# Patient Record
Sex: FEMALE | Race: WHITE | Employment: UNEMPLOYED | URBAN - METROPOLITAN AREA
[De-identification: names, ages, dates, MRNs, and addresses within clinical notes are randomized per-mention and may not be internally consistent; named-entity substitution may affect disease eponyms.]

---

## 2018-03-21 PROBLEM — D36.9 ADENOMATOUS POLYPS: Status: ACTIVE | Noted: 2018-03-21

## 2018-03-21 PROBLEM — Z79.01 CURRENT USE OF LONG TERM ANTICOAGULATION: Status: ACTIVE | Noted: 2018-03-21

## 2018-03-22 ENCOUNTER — APPOINTMENT (OUTPATIENT)
Dept: LAB | Age: 63
End: 2018-03-22
Attending: INTERNAL MEDICINE
Payer: COMMERCIAL

## 2018-03-22 DIAGNOSIS — I48.0 PAROXYSMAL ATRIAL FIBRILLATION (HCC): ICD-10-CM

## 2018-03-22 LAB
ALBUMIN SERPL-MCNC: 4 G/DL (ref 3.5–4.8)
ALP LIVER SERPL-CCNC: 59 U/L (ref 50–130)
ALT SERPL-CCNC: 30 U/L (ref 14–54)
AST SERPL-CCNC: 22 U/L (ref 15–41)
BILIRUB SERPL-MCNC: 0.5 MG/DL (ref 0.1–2)
BUN BLD-MCNC: 14 MG/DL (ref 8–20)
CALCIUM BLD-MCNC: 8.7 MG/DL (ref 8.3–10.3)
CHLORIDE: 106 MMOL/L (ref 101–111)
CO2: 27 MMOL/L (ref 22–32)
CREAT BLD-MCNC: 1.01 MG/DL (ref 0.55–1.02)
GLUCOSE BLD-MCNC: 72 MG/DL (ref 70–99)
M PROTEIN MFR SERPL ELPH: 7.3 G/DL (ref 6.1–8.3)
POTASSIUM SERPL-SCNC: 4.1 MMOL/L (ref 3.6–5.1)
SODIUM SERPL-SCNC: 139 MMOL/L (ref 136–144)

## 2018-03-22 PROCEDURE — 36415 COLL VENOUS BLD VENIPUNCTURE: CPT

## 2018-03-22 PROCEDURE — 80053 COMPREHEN METABOLIC PANEL: CPT

## 2018-12-11 PROBLEM — Z86.0100 PERSONAL HISTORY OF COLONIC POLYPS: Status: ACTIVE | Noted: 2018-12-11

## 2018-12-11 PROBLEM — Z86.010 PERSONAL HISTORY OF COLONIC POLYPS: Status: ACTIVE | Noted: 2018-12-11

## 2019-12-03 ENCOUNTER — APPOINTMENT (OUTPATIENT)
Dept: LAB | Age: 64
End: 2019-12-03
Attending: INTERNAL MEDICINE
Payer: MEDICARE

## 2019-12-03 DIAGNOSIS — I48.0 PAROXYSMAL ATRIAL FIBRILLATION (HCC): ICD-10-CM

## 2019-12-03 PROCEDURE — 36415 COLL VENOUS BLD VENIPUNCTURE: CPT

## 2019-12-03 PROCEDURE — 80053 COMPREHEN METABOLIC PANEL: CPT

## 2020-11-16 ENCOUNTER — APPOINTMENT (OUTPATIENT)
Dept: GENERAL RADIOLOGY | Facility: HOSPITAL | Age: 65
End: 2020-11-16
Attending: EMERGENCY MEDICINE
Payer: MEDICARE

## 2020-11-16 ENCOUNTER — HOSPITAL ENCOUNTER (EMERGENCY)
Facility: HOSPITAL | Age: 65
Discharge: HOME | End: 2020-11-16
Attending: EMERGENCY MEDICINE
Payer: MEDICARE

## 2020-11-16 VITALS
WEIGHT: 133 LBS | RESPIRATION RATE: 19 BRPM | HEIGHT: 63 IN | HEART RATE: 71 BPM | BODY MASS INDEX: 23.57 KG/M2 | DIASTOLIC BLOOD PRESSURE: 97 MMHG | OXYGEN SATURATION: 100 % | TEMPERATURE: 98 F | SYSTOLIC BLOOD PRESSURE: 177 MMHG

## 2020-11-16 DIAGNOSIS — U07.1 COVID-19 VIRUS INFECTION: Primary | ICD-10-CM

## 2020-11-16 DIAGNOSIS — E86.0 DEHYDRATION: ICD-10-CM

## 2020-11-16 PROCEDURE — 99453 REM MNTR PHYSIOL PARAM SETUP: CPT

## 2020-11-16 PROCEDURE — 36415 COLL VENOUS BLD VENIPUNCTURE: CPT

## 2020-11-16 PROCEDURE — 84145 PROCALCITONIN (PCT): CPT | Performed by: EMERGENCY MEDICINE

## 2020-11-16 PROCEDURE — 82728 ASSAY OF FERRITIN: CPT | Performed by: EMERGENCY MEDICINE

## 2020-11-16 PROCEDURE — 86140 C-REACTIVE PROTEIN: CPT | Performed by: EMERGENCY MEDICINE

## 2020-11-16 PROCEDURE — 96360 HYDRATION IV INFUSION INIT: CPT

## 2020-11-16 PROCEDURE — 82550 ASSAY OF CK (CPK): CPT | Performed by: EMERGENCY MEDICINE

## 2020-11-16 PROCEDURE — 71045 X-RAY EXAM CHEST 1 VIEW: CPT | Performed by: EMERGENCY MEDICINE

## 2020-11-16 PROCEDURE — 85025 COMPLETE CBC W/AUTO DIFF WBC: CPT | Performed by: EMERGENCY MEDICINE

## 2020-11-16 PROCEDURE — 85379 FIBRIN DEGRADATION QUANT: CPT | Performed by: EMERGENCY MEDICINE

## 2020-11-16 PROCEDURE — 80053 COMPREHEN METABOLIC PANEL: CPT | Performed by: EMERGENCY MEDICINE

## 2020-11-16 PROCEDURE — 93005 ELECTROCARDIOGRAM TRACING: CPT

## 2020-11-16 PROCEDURE — 99285 EMERGENCY DEPT VISIT HI MDM: CPT

## 2020-11-16 PROCEDURE — 99284 EMERGENCY DEPT VISIT MOD MDM: CPT

## 2020-11-16 PROCEDURE — 84484 ASSAY OF TROPONIN QUANT: CPT | Performed by: EMERGENCY MEDICINE

## 2020-11-16 PROCEDURE — 93010 ELECTROCARDIOGRAM REPORT: CPT

## 2020-11-16 PROCEDURE — 83615 LACTATE (LD) (LDH) ENZYME: CPT | Performed by: EMERGENCY MEDICINE

## 2020-11-16 PROCEDURE — 83880 ASSAY OF NATRIURETIC PEPTIDE: CPT | Performed by: EMERGENCY MEDICINE

## 2020-11-16 RX ORDER — SODIUM CHLORIDE 9 MG/ML
INJECTION, SOLUTION INTRAVENOUS CONTINUOUS
Status: DISCONTINUED | OUTPATIENT
Start: 2020-11-16 | End: 2020-11-16

## 2020-11-16 NOTE — ED INITIAL ASSESSMENT (HPI)
Pt reports generalized fatigue, dizziness today, and poor appetite. Returned from [place] on Wednesday. Possible covid contact prior to coming home.

## 2020-11-16 NOTE — ED NOTES
Ambulated pt with pulse ox. on left index finger. Ranged from 99%-100%, respirations went from 19 to 23 breaths/min, HR increased from 80 to 85.  Pt states she feels tired and \"a little short of breath\" but she feels like that every time she goes for a wa

## 2020-11-16 NOTE — ED PROVIDER NOTES
Patient Seen in: [facility] Emergency Department      History   Patient presents with:  Headache  Testing    Stated Complaint[place] [place] [facility]   neighbors in [place] are sick    HPI    78-year-old female with history HPI.  Constitutional and vital signs reviewed. All other systems reviewed and negative except as noted above.     Physical Exam     ED Triage Vitals [11/16/20 1430]   BP (!) 160/92   Pulse 62   Resp 20   Temp 97.6 °F (36.4 °C)   Temp src Temporal   SpO Please view results for these tests on the individual orders.    D-DIMER   PROCALCITONIN   RAINBOW DRAW BLUE   RAINBOW DRAW LAVENDER   RAINBOW DRAW LIGHT GREEN   RAINBOW DRAW GOLD   BLOOD CULTURE   BLOOD CULTURE   CBC W/ DIFFERENTIAL                  MD

## 2021-02-07 ENCOUNTER — APPOINTMENT (OUTPATIENT)
Dept: GENERAL RADIOLOGY | Age: 66
End: 2021-02-07
Attending: EMERGENCY MEDICINE
Payer: MEDICARE

## 2021-02-07 ENCOUNTER — HOSPITAL ENCOUNTER (EMERGENCY)
Age: 66
Discharge: HOME | End: 2021-02-07
Attending: EMERGENCY MEDICINE
Payer: MEDICARE

## 2021-02-07 VITALS
WEIGHT: 130 LBS | HEIGHT: 63 IN | DIASTOLIC BLOOD PRESSURE: 65 MMHG | TEMPERATURE: 98 F | SYSTOLIC BLOOD PRESSURE: 106 MMHG | HEART RATE: 74 BPM | OXYGEN SATURATION: 96 % | BODY MASS INDEX: 23.04 KG/M2 | RESPIRATION RATE: 20 BRPM

## 2021-02-07 DIAGNOSIS — R00.2 PALPITATIONS: Primary | ICD-10-CM

## 2021-02-07 LAB
ALBUMIN SERPL-MCNC: 3.8 G/DL (ref 3.4–5)
ALBUMIN/GLOB SERPL: 1.1 {RATIO} (ref 1–2)
ALP LIVER SERPL-CCNC: 57 U/L
ALT SERPL-CCNC: 24 U/L
ANION GAP SERPL CALC-SCNC: 6 MMOL/L (ref 0–18)
AST SERPL-CCNC: 30 U/L (ref 15–37)
BASOPHILS # BLD AUTO: 0.04 X10(3) UL (ref 0–0.2)
BASOPHILS NFR BLD AUTO: 0.5 %
BILIRUB SERPL-MCNC: 0.6 MG/DL (ref 0.1–2)
BUN BLD-MCNC: 12 MG/DL (ref 7–18)
BUN/CREAT SERPL: 12.5 (ref 10–20)
CALCIUM BLD-MCNC: 8.7 MG/DL (ref 8.5–10.1)
CHLORIDE SERPL-SCNC: 109 MMOL/L (ref 98–112)
CO2 SERPL-SCNC: 23 MMOL/L (ref 21–32)
CREAT BLD-MCNC: 0.96 MG/DL
D-DIMER: <0.27 UG/ML FEU (ref ?–0.67)
DEPRECATED RDW RBC AUTO: 44.6 FL (ref 35.1–46.3)
EOSINOPHIL # BLD AUTO: 0.06 X10(3) UL (ref 0–0.7)
EOSINOPHIL NFR BLD AUTO: 0.8 %
ERYTHROCYTE [DISTWIDTH] IN BLOOD BY AUTOMATED COUNT: 13.5 % (ref 11–15)
GLOBULIN PLAS-MCNC: 3.6 G/DL (ref 2.8–4.4)
GLUCOSE BLD-MCNC: 154 MG/DL (ref 70–99)
HAV IGM SER QL: 1.9 MG/DL (ref 1.6–2.6)
HCT VFR BLD AUTO: 42.6 %
HGB BLD-MCNC: 14.4 G/DL
IMM GRANULOCYTES # BLD AUTO: 0.02 X10(3) UL (ref 0–1)
IMM GRANULOCYTES NFR BLD: 0.3 %
LYMPHOCYTES # BLD AUTO: 1.16 X10(3) UL (ref 1–4)
LYMPHOCYTES NFR BLD AUTO: 14.5 %
M PROTEIN MFR SERPL ELPH: 7.4 G/DL (ref 6.4–8.2)
MCH RBC QN AUTO: 30.4 PG (ref 26–34)
MCHC RBC AUTO-ENTMCNC: 33.8 G/DL (ref 31–37)
MCV RBC AUTO: 89.9 FL
MONOCYTES # BLD AUTO: 0.55 X10(3) UL (ref 0.1–1)
MONOCYTES NFR BLD AUTO: 6.9 %
NEUTROPHILS # BLD AUTO: 6.17 X10 (3) UL (ref 1.5–7.7)
NEUTROPHILS # BLD AUTO: 6.17 X10(3) UL (ref 1.5–7.7)
NEUTROPHILS NFR BLD AUTO: 77 %
OSMOLALITY SERPL CALC.SUM OF ELEC: 289 MOSM/KG (ref 275–295)
PLATELET # BLD AUTO: 321 10(3)UL (ref 150–450)
POTASSIUM SERPL-SCNC: 3.8 MMOL/L (ref 3.5–5.1)
RBC # BLD AUTO: 4.74 X10(6)UL
SODIUM SERPL-SCNC: 138 MMOL/L (ref 136–145)
TROPONIN I SERPL-MCNC: <0.045 NG/ML (ref ?–0.04)
TROPONIN I SERPL-MCNC: <0.045 NG/ML (ref ?–0.04)
WBC # BLD AUTO: 8 X10(3) UL (ref 4–11)

## 2021-02-07 PROCEDURE — 85025 COMPLETE CBC W/AUTO DIFF WBC: CPT | Performed by: EMERGENCY MEDICINE

## 2021-02-07 PROCEDURE — 93010 ELECTROCARDIOGRAM REPORT: CPT

## 2021-02-07 PROCEDURE — 71045 X-RAY EXAM CHEST 1 VIEW: CPT | Performed by: EMERGENCY MEDICINE

## 2021-02-07 PROCEDURE — 99284 EMERGENCY DEPT VISIT MOD MDM: CPT

## 2021-02-07 PROCEDURE — 85379 FIBRIN DEGRADATION QUANT: CPT | Performed by: EMERGENCY MEDICINE

## 2021-02-07 PROCEDURE — 83735 ASSAY OF MAGNESIUM: CPT | Performed by: EMERGENCY MEDICINE

## 2021-02-07 PROCEDURE — 84484 ASSAY OF TROPONIN QUANT: CPT | Performed by: EMERGENCY MEDICINE

## 2021-02-07 PROCEDURE — 93005 ELECTROCARDIOGRAM TRACING: CPT

## 2021-02-07 PROCEDURE — 36415 COLL VENOUS BLD VENIPUNCTURE: CPT

## 2021-02-07 PROCEDURE — 80053 COMPREHEN METABOLIC PANEL: CPT | Performed by: EMERGENCY MEDICINE

## 2021-02-07 PROCEDURE — 99285 EMERGENCY DEPT VISIT HI MDM: CPT

## 2021-02-07 NOTE — ED INITIAL ASSESSMENT (HPI)
Patient reports having 1st covid vaccine yesterday - c/o aches, weakness since this am - patient also reports rapid heart rate in 140s per pulse ox and bp machine at home

## 2021-02-07 NOTE — ED PROVIDER NOTES
Patient Seen in: THE [facility] Emergency Department In [place]      History   Patient presents with:  Medication Reaction    Stated Complaint: Covid vaccine yesterday- not feeling well- rapid heart rate and weakness    HPI/Subjective:   HPI    Patient is a 6 vital signs reviewed. All other systems reviewed and negative except as noted above.     Physical Exam     ED Triage Vitals [02/07/21 1551]   /80   Pulse 96   Resp 16   Temp 98 °F (36.7 °C)   Temp src Temporal   SpO2 96 %   O2 Device None (Room a ---------                               -----------         ------                     CBC W/ DIFFERENTIAL[[id number]]                              Final result                 Please view results for these tests on the individual orders.    RAINBOW DRAW B Normal   TROPONIN I - Normal   CBC WITH DIFFERENTIAL WITH PLATELET    Narrative: The following orders were created for panel order CBC WITH DIFFERENTIAL WITH PLATELET.   Procedure                               Abnormality         Status for discharge                     Disposition and Plan     Clinical Impression:  Palpitations  (primary encounter diagnosis)    Disposition:  Discharge  2/7/2021  6:39 pm    Follow-up:  Raimundo Jack MD  [phone] [facility] [phone]

## 2021-02-08 LAB
ATRIAL RATE: 92 BPM
P AXIS: 69 DEGREES
P-R INTERVAL: 166 MS
Q-T INTERVAL: 410 MS
QRS DURATION: 76 MS
QTC CALCULATION (BEZET): 507 MS
R AXIS: 56 DEGREES
T AXIS: 86 DEGREES
VENTRICULAR RATE: 92 BPM

## 2023-11-28 ENCOUNTER — OFFICE VISIT (OUTPATIENT)
Dept: HEMATOLOGY/ONCOLOGY | Facility: HOSPITAL | Age: 68
End: 2023-11-28
Attending: SURGERY
Payer: MEDICARE

## 2023-11-28 ENCOUNTER — GENETICS ENCOUNTER (OUTPATIENT)
Dept: GENETICS | Facility: HOSPITAL | Age: 68
End: 2023-11-28
Attending: SURGERY
Payer: MEDICARE

## 2023-11-28 ENCOUNTER — OFFICE VISIT (OUTPATIENT)
Facility: LOCATION | Age: 68
End: 2023-11-28
Payer: MEDICARE

## 2023-11-28 ENCOUNTER — NURSE NAVIGATOR ENCOUNTER (OUTPATIENT)
Dept: HEMATOLOGY/ONCOLOGY | Facility: HOSPITAL | Age: 68
End: 2023-11-28

## 2023-11-28 VITALS
HEART RATE: 78 BPM | RESPIRATION RATE: 16 BRPM | OXYGEN SATURATION: 98 % | WEIGHT: 141.38 LBS | TEMPERATURE: 97 F | BODY MASS INDEX: 25 KG/M2 | SYSTOLIC BLOOD PRESSURE: 175 MMHG | DIASTOLIC BLOOD PRESSURE: 91 MMHG

## 2023-11-28 DIAGNOSIS — C50.919 MALIGNANT NEOPLASM OF BREAST (FEMALE) (HCC): Primary | ICD-10-CM

## 2023-11-28 DIAGNOSIS — Z17.0 MALIGNANT NEOPLASM OF RIGHT BREAST IN FEMALE, ESTROGEN RECEPTOR POSITIVE, UNSPECIFIED SITE OF BREAST: ICD-10-CM

## 2023-11-28 DIAGNOSIS — I48.0 PAROXYSMAL ATRIAL FIBRILLATION (HCC): ICD-10-CM

## 2023-11-28 DIAGNOSIS — C50.411 MALIGNANT NEOPLASM OF UPPER-OUTER QUADRANT OF RIGHT BREAST IN FEMALE, ESTROGEN RECEPTOR POSITIVE: Primary | ICD-10-CM

## 2023-11-28 DIAGNOSIS — Z85.3 HISTORY OF RIGHT BREAST CANCER: ICD-10-CM

## 2023-11-28 DIAGNOSIS — Z79.01 ANTICOAGULATED: ICD-10-CM

## 2023-11-28 DIAGNOSIS — C50.911 MALIGNANT NEOPLASM OF RIGHT BREAST IN FEMALE, ESTROGEN RECEPTOR POSITIVE, UNSPECIFIED SITE OF BREAST: ICD-10-CM

## 2023-11-28 DIAGNOSIS — Z17.0 MALIGNANT NEOPLASM OF UPPER-OUTER QUADRANT OF RIGHT BREAST IN FEMALE, ESTROGEN RECEPTOR POSITIVE: Primary | ICD-10-CM

## 2023-11-28 DIAGNOSIS — Z85.3 PERSONAL HISTORY OF BREAST CANCER: ICD-10-CM

## 2023-11-28 PROCEDURE — 99211 OFF/OP EST MAY X REQ PHY/QHP: CPT

## 2023-11-28 PROCEDURE — 36415 COLL VENOUS BLD VENIPUNCTURE: CPT

## 2023-11-28 PROCEDURE — 96040 HC GENETIC COUNSELING EA 30 MIN: CPT | Performed by: GENETIC COUNSELOR, MS

## 2023-11-28 PROCEDURE — 99205 OFFICE O/P NEW HI 60 MIN: CPT | Performed by: SURGERY

## 2023-11-28 NOTE — PROGRESS NOTES
Met with patient in clinic. Introduced myself as one the breast nurse navigators and explained the role of the breast nurse navigator and coordination of care. Explained the role of all of the physicians involved in her care including the surgeon, medical oncologist, radiation oncologist, and possibly plastic surgeon. Reviewed over the patients pathology report and discussed receptors including ER/NH/ and Her 2. Patient was given the breast cancer treatment handbook and reviewed over how to use this resource. Discussed the breast multidisciplinary conference and that her case was discussed. Patient was given the contact information for the social workers at the [facility] and resources for support as requested. Breast cancer journey map provided to patient and discussed possible Oncotype, if applicable, and other cancer treatments such as chemotherapy and radiation. Provided lumpectomy surgical sheet. Next step in care will be to have genetic testing done today and then will plan for unilateral mastectomy. Pt was provided with breast nurse navigator contact information and was encouraged to phone with any other questions or concerns.

## 2023-11-28 NOTE — H&P
New Patient Visit Note       Active Problems      1. Malignant neoplasm of upper-outer quadrant of right breast in female, estrogen receptor positive     2. History of right breast cancer    3. Paroxysmal atrial fibrillation (HCC)    4. Anticoagulated        Chief Complaint   Recurrent right breast cancer      History of Present Illness   The patient is a 66-year-old female seen at the request of her primary care physician regarding a new diagnosis of right breast cancer. The patient has a prior history of right-sided breast cancer, treated in November 2000 and [place]. At that time, she underwent lumpectomy with axillary lymph node dissection. She had 35 sessions of radiation therapy. She then took tamoxifen for 2 years and then was switched to another medication for 2-1/2 years. At the age of 48, since her cancer was driven by estrogen, she underwent a total hysterectomy with bilateral oophorectomy. The patient states she had significant hot flashes after starting tamoxifen that lasted 15 years. She has no family history of breast, ovarian, or prostate cancer. She has been pregnant once, at the age of 33. She has 1 daughter. She did not breast-feed her daughter. She went through menarche at the age of 11 and menopause after her hysterectomy at the age of 48. The patient does have a history of atrial fibrillation and is on Xarelto. Allergies  Nidia has No Known Allergies. Past Medical / Surgical / Social / Family History    The past medical and past surgical history have been reviewed by me today.     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    Anxiety     Arrhythmia     Arthritis     Atrial fibrillation (HCC)     Back pain     Blurred vision     Breast cancer (HCC)     Easy bruising     Fatigue     Feeling lonely     Heart palpitations     History of depression     History of rheumatic fever Hypercholesterolemia     Hypertension     Mild tricuspid regurgitation     Night sweats     Rheumatic heart disease     Skin blushing/flushing     Sleep disturbance     Stress     Syncope     Thyroid disease     TIA (transient ischemic attack)     Wears glasses      Past Surgical History:   Procedure Laterality Date    HYSTERECTOMY      OTHER SURGICAL HISTORY      Mastectomy    THYROIDECTOMY      Partial       The family history and social history have been reviewed by me today. Family History   Problem Relation Age of Onset    Heart Attack Father     Alcohol and Other Disorders Associated Father     Hypertension Father     Heart Attack Other      Social History     Socioeconomic History    Marital status:     Number of children: 1   Tobacco Use    Smoking status: Never    Smokeless tobacco: Never   Substance and Sexual Activity    Alcohol use: Yes     Alcohol/week: 0.0 standard drinks of alcohol     Comment: Rare glass of wine/occassional    Drug use: No   Other Topics Concern    Caffeine Concern Yes     Comment: 1-2 large cups daily        Current Outpatient Medications:     Rivaroxaban (XARELTO) 20 MG Oral Tab, TAKE ONE TABLET BY MOUTH DAILY WITH FOOD, Disp: 90 tablet, Rfl: 3    dronedarone HCl (MULTAQ) 400 MG Oral Tab, Take 1 tablet (400 mg total) by mouth 2 (two) times daily. , Disp: 180 tablet, Rfl: 3    ATORVASTATIN 10 MG Oral Tab, TAKE 1 TABLET BY MOUTH EVERY NIGHT AT BEDTIME, Disp: 90 tablet, Rfl: 3    METOPROLOL SUCCINATE ER 25 MG Oral Tablet 24 Hr, TAKE 1/2 TABLET(12.5 MG) BY MOUTH EVERY DAY (Patient not taking: Reported on 11/28/2023), Disp: 45 tablet, Rfl: 3    escitalopram 10 MG Oral Tab, TK ONE T PO ONCE A DAY (Patient not taking: Reported on 11/28/2023), Disp: , Rfl: 0    PEG 3350-KCl-NaBcb-NaCl-NaSulf (PEG 3350/ELECTROLYTES) 240 g Oral Recon Soln, Take as directed by physician.  (Patient not taking: Reported on 11/28/2023), Disp: 4000 mL, Rfl: 0    Coenzyme Q10 (CO Q-10) 100 MG Oral Cap, Take by mouth., Disp: , Rfl:     Levothyroxine Sodium (SYNTHROID) 50 MCG Oral Tab, Take 1 tablet (50 mcg total) by mouth before breakfast., Disp: , Rfl: 2    Ergocalciferol (VITAMIN D OR), Take 400 Units by mouth., Disp: , Rfl:     Review of Systems:    Allergic/Immuno:  Review of patient's allergies performed. Cardiovascular:  Negative for cool extremity. Positive for irregular heartbeat/palpitations  Constitutional:  Negative for decreased activity, fever, irritability and lethargy  Endocrine:  Negative for abnormal sleep patterns, increased activity, polydipsia and polyphagia  ENMT:  Negative for ear drainage, hearing loss and nasal drainage  Eyes:  Negative for eye discharge and vision loss  Gastrointestinal:  Negative for abdominal pain, constipation, decreased appetite, diarrhea and vomiting  Genitourinary:  Negative for dysuria and hematuria  Hema/Lymph:  Negative for easy bleeding and easy bruising  Integumentary:  Negative for pruritus and rash  Musculoskeletal:  Negative for bone/joint symptoms  Neurological:  Negative for gait disturbance  Psychiatric:  Negative for inappropriate interaction and psychiatric symptoms  Respiratory:  Negative for cough, dyspnea and wheezing       Physical Exam  Vitals and nursing note reviewed. Constitutional:       General: She is not in acute distress. Appearance: She is well-developed. She is not diaphoretic. HENT:      Head: Normocephalic and atraumatic. Eyes:      General: No scleral icterus. Conjunctiva/sclera: Conjunctivae normal.      Pupils: Pupils are equal, round, and reactive to light. Neck:      Vascular: No JVD. Trachea: Trachea normal.   Cardiovascular:      Rate and Rhythm: Normal rate. Rhythm irregularly irregular. Heart sounds: S1 normal and S2 normal. No murmur heard. Pulmonary:      Effort: No accessory muscle usage or respiratory distress. Breath sounds: No decreased breath sounds, wheezing, rhonchi or rales.    Chest: Chest wall: No mass. Breasts:     Breasts are symmetrical.      Right: No inverted nipple, mass, nipple discharge, skin change or tenderness. Left: No inverted nipple, mass, nipple discharge, skin change or tenderness. Comments: Well-healed right breast scar. Biopsy site with slight ecchymosis. There is significant size disparity between the postsurgical right breast and the left breast.  Musculoskeletal:      Cervical back: Full passive range of motion without pain and neck supple. Lymphadenopathy:      Head:      Right side of head: No submental, submandibular, preauricular, posterior auricular or occipital adenopathy. Left side of head: No submental, submandibular, preauricular, posterior auricular or occipital adenopathy. Cervical:      Right cervical: No superficial, deep or posterior cervical adenopathy. Left cervical: No superficial, deep or posterior cervical adenopathy. Upper Body:      Right upper body: No axillary or pectoral adenopathy. Left upper body: No axillary or pectoral adenopathy. Neurological:      Mental Status: She is alert and oriented to person, place, and time. Psychiatric:         Speech: Speech normal.         Behavior: Behavior normal.           PATHOLOGY   I reviewed the pathology report. FINAL PATHOLOGY DIAGNOSIS:        Right breast (10:00, 3 cm from nipple), ultrasound guided biopsy: Invasive    mammary carcinoma, micropapillary type. Histologic grade ([place] histologic grading): Grade 2 (score 7)    (Tubules: Score 3; Nuclear : Score 3; and Mitotic rate: Score 1)      Tumor size: At least 0.5 cm maximum dimension. Extent of tumor: The tumor comprises approximately 40% of the biopsy, and is    present in several submitted biopsy fragments. Additional Tumor Features:    Lymphatic/Vascular Invasion: Identified. Microcalcifications: Not identified.       Right breast tumor markers, block A2:    Estrogen receptor (ER): Positive (approximately 95%; staining intensity:    strong). Progesterone receptor (PgR): Positive (approximately 95%; staining    intensity: strong). Her-2: Negative (1+). Ki 67 (Mib-1): High proliferative index (approximately 25%). Assessment   1. Malignant neoplasm of upper-outer quadrant of right breast in female, estrogen receptor positive     2. History of right breast cancer    3. Paroxysmal atrial fibrillation (HCC)    4. Anticoagulated          Plan   I had a lengthy discussion with the patient regarding the diagnosis of breast cancer. We discussed the biology of breast cancer as well as the difference between in situ and invasive disease. We discussed the treatment options of breast cancer in regards to surgery, radiation, chemotherapy, and endocrine therapy. In regards to surgery, we discussed the options of lumpectomy and mastectomy and the difference between each option. The possibility of another surgery to achieve clear margins was also discussed. The risks of surgery were discussed including bleeding, infection, need for reoperation, and arm swelling. She expressed understanding. We discussed the role of radiation therapy and if she opts for lumpectomy, she will need radiation therapy and the length and course of radiation therapy will depend on the final pathology. We also discussed situations where radiation therapy is needed after mastectomy (large tumor size, positive lymph nodes and positive margins on a mastectomy). The final decision regarding raditation therapy would be made after surgery. In regards to chemotherapy, I will send her to medical oncology. Based on the final pathology report it will be determined if chemotherapy or endocrine therapy would provide any additional benefit. Since this is a recurrence in the same breast, she will need a mastectomy. We discussed the option of reconstruction, but she declined.   The patient does qualify for genetic testing. She will meet with our genetic counselor today. I did discuss that if she is gene positive, I would then recommend a bilateral mastectomy. She will need preoperative cardiac clearance and advice regarding management of her Xarelto prior to surgery. Her blood pressure was also found to be elevated at today's visit. I recommended she call her PCP. All of her questions were answered to her satisfaction. Preoperatively, the patient has stage I breast cancer.        Amina Dalton MD

## 2023-11-28 NOTE — PROGRESS NOTES
Patient presents to clinic for breast consultation for recent invasive mammary carcinoma diagnosis. Patient's mammogram and biopsy were performed at [facility]. Various discs and reports were brought in and given to the nurse navigator team.  Patient's blood pressure on arrival was 172/92. Patient denies swelling, redness, warmth, fever, bleeding/discharge, or pain. Dr. Maryanne Mohs informed. Medication, allergies, and history reviewed and updated. Post visit blood pressure readin/91. Per Dr. Maryanne Mohs, patient instructed to contact her PCP to discuss further.

## 2023-11-29 ENCOUNTER — NURSE NAVIGATOR ENCOUNTER (OUTPATIENT)
Dept: HEMATOLOGY/ONCOLOGY | Facility: HOSPITAL | Age: 68
End: 2023-11-29

## 2023-11-29 NOTE — PROGRESS NOTES
Referring Provider:  Mehnaz Luciano MD    Additional Provider(s):  Jammie Miller PA-C    Reason for Referral:  Aurea Cotton was referred for genetic counseling because of a new diagnosis of breast cancer. Ms. Elie Ribera is a 77year-old woman of [place] [facility] descent who was diagnosed with an ER/PA-positive, HER2-negative invasive mammary carcinoma of the right breast on 23. Her reported medical history is notable for a right-sided, hormone positive breast cancer at age 50 for which she had a right lumpectomy and radiation treatment. She took Tamoxifen, reportedly had a HOLLY/BSO at age 48, and then took a different hormonal suppression agent for several years after that. Ms. Mónica Brower 16 colonoscopy was negative for polyps; a repeat screening colonoscopy was recommended in 10 years. Social History:  Ms. Elie Ribera was seen today by herself. She lives in [facility]. Her goal is to complete breast surgery before [place] because her daughter and grandchildren are going to be visiting from [place]. Family History:   A three generation pedigree was obtained. Ms. Elie Ribera has a 40year-old daughter and two grandchildren. Ms. Elie Ribera had two brothers and no sisters. Both of Ms. Ricci's brothers  in their 62s from heart disease. Ms. Mónica Brower mother  at age 80; her only history of cancer was of non-melanoma skin cancer. Ms. Mónica Brower mother had two brothers and no sisters. One of Ms. Ricci's maternal cousins had colorectal cancer at an unspecified older age. Ms. Mónica Brower maternal grandparents  in their [de-identified] and did not have cancer. Ms. Mónica Brower father  at age 68 and did not have cancer. Ms. Mónica Brower father had four brothers and four sisters, none of whom had cancer. Ms. Mónica Brower paternal grandparents  in their [de-identified] and did not have cancer. Please see the pedigree for additional family history information. Counseling:    The following information was discussed with Ms. Kavin Boston. Genetics of Breast Cancer: In the [place], approximately 1 in 8 women will develop breast cancer. Although the majority of breast cancer cases are sporadic, approximately 5-10% of women with breast cancer have a hereditary cancer syndrome. Signs of a hereditary cancer syndrome include some rare cancers, common cancers occurring at unusually young ages, multiple primary cancers in the same individual, or the same type of cancer or related cancers (e.g., breast and ovarian, colorectal and endometrial) in three or more individuals in the same lineage. Mutations in the genes, BRCA1 and BRCA2, account for the majority of hereditary breast and ovarian cancer families. Mutations in genes other than BRCA1/2, many of which now have medical management recommendations (e.g., NELL, CHEK2, PALB2) are identified in 3-10% of individuals tested using a multigene panel. Risk Assessment:   Ms. Kavin Boston meets NCCN Guidelines testing criteria for high-penetrance breast cancer susceptibility genes. Risk assessment to estimate the chance of Ms. Kavin Boston harboring a BRCA1/2 pathogenic variant was done by using the Chicho II Model. Based on this model, Ms. Orion Ventura risk of carrying a BRCA1/2 pathogenic variant is estimated to be 8%. It is important to note that all prediction models have limitations and medical management should be based on clinical judgment, and personal and family history. In addition, there are no prediction models or testing criteria for moderate penetrance cancer predisposition genes such as NELL and CHEK2. I recommend that testing be performed as part of a multigene panel. Genetic Testing (Panel): The pros, cons, and limitations of genetic testing were discussed including the potential implications of test results on clinical management. If a pathogenic variant is not identified (negative result), it is still possible that Ms. Kavin Boston has a pathogenic variant in one of these genes that was not detected by the genetic test, or that the family is dealing with a hereditary cancer syndrome involving a different gene. It is also possible that Ms. Ricci's relatives have a pathogenic variant in one of these genes that Ms. Arlen Elena did not inherit. In this scenario, options for cancer screening/management should be determined according to personal and family histories and should be discussed with a physician. A variant of uncertain significance is a DNA change that may or may not alter the function of the gene; therefore, it is usually not possible to determine if the gene variant is responsible for an individual's increased cancer risk. If Ms. Arlen Elena is found to carry a pathogenic variant in a cancer predisposition gene, she is at significantly increased risk for various cancers. The magnitude of these risks, and the cancers for which she is at increased risk would depend on the gene involved. Medical recommendations for individuals with BRCA1/2 pathogenic variants were reviewed as an example. It was also explained that for some of the genes for which testing is available, the associated cancer risks have yet to be determined and medical management recommendations may not yet be available for individuals with pathogenic variants in these genes. If she were to test positive for a pathogenic variant, her children and siblings would each have a 50% chance of carrying the same variant. At-risk adults (>18) would have the option of pursuing targeted genetic testing to clarify their cancer risks. Genetic test results have implications for the entire biological family. Thus, it is recommended that she share her genetic test results with her biological family members so that they may have their risk assessed. Genetic Information Non-Discrimination Act:   The legal protections of the Genetic Information Nondiscrimination Act (JESSIE) for health insurance and employment were humphreyJessika ROMAN does not provide protection for life insurance, disability or long-term care insurance. Summary and Plan:  Ms. Karma Walter was referred for genetic counseling because of a new diagnosis of breast cancer. Her reported family history is not highly suspicious for a hereditary cancer syndrome; however, genetic testing on Ms. Karma Walter for BRCA1/2 pathogenic variants as part of a multigene panel is indicated based on her personal history of breast cancer. At the conclusion of the counseling session Ms. Karma Walter decided to proceed with genetic testing. Written consent was obtained. Blood and paperwork were sent to [facility] for their Breast Cancer STAT panel (NELL, BRCA1, BRCA2, CDH1, CHEK2, PALB2, PTEN, STK11, and TP53). I anticipate that Ms. Ricci's results will be available within 6-13 days and will call her with the results. Results will also be communicated to Dr. Adryan Valiente and Mr. Stefany Fountain. Approximately 40 minutes was spent in consultation with Ms. Karma Walter.

## 2023-11-30 ENCOUNTER — LAB ENCOUNTER (OUTPATIENT)
Dept: LAB | Facility: HOSPITAL | Age: 68
End: 2023-11-30
Attending: SURGERY
Payer: MEDICARE

## 2023-11-30 DIAGNOSIS — C50.919 INVASIVE CARCINOMA OF BREAST (HCC): Primary | ICD-10-CM

## 2023-11-30 PROCEDURE — 88321 CONSLTJ&REPRT SLD PREP ELSWR: CPT

## 2023-12-01 ENCOUNTER — TELEPHONE (OUTPATIENT)
Facility: LOCATION | Age: 68
End: 2023-12-01

## 2023-12-01 DIAGNOSIS — C50.911 MALIGNANT NEOPLASM OF RIGHT FEMALE BREAST, UNSPECIFIED ESTROGEN RECEPTOR STATUS, UNSPECIFIED SITE OF BREAST (HCC): Primary | ICD-10-CM

## 2023-12-04 ENCOUNTER — TELEPHONE (OUTPATIENT)
Facility: LOCATION | Age: 68
End: 2023-12-04

## 2023-12-04 NOTE — TELEPHONE ENCOUNTER
RN [facility] from [facility] called to see if the patient needed a pre-op authorization before the patient's surgery with .     RIGHT BREAST MASTECTOMY 1/8/24    Call back # [phone]

## 2023-12-05 ENCOUNTER — TELEPHONE (OUTPATIENT)
Dept: HEMATOLOGY/ONCOLOGY | Facility: HOSPITAL | Age: 68
End: 2023-12-05

## 2023-12-05 ENCOUNTER — GENETICS ENCOUNTER (OUTPATIENT)
Dept: HEMATOLOGY/ONCOLOGY | Facility: HOSPITAL | Age: 68
End: 2023-12-05

## 2023-12-05 NOTE — TELEPHONE ENCOUNTER
Patient called back in regards to the VM I left in regards to the pre op mastectomy class. Described the pre op mastectomy class and what it entails and scheduled her before her mastectomy surgery with Dr. Lisa Wilson on January 3, 2024 at the [facility]. She thanked me for the assistance. Pt was provided with the breast nurse navigators contact information and was encouraged to phone with any other questions or concerns.

## 2023-12-05 NOTE — PROGRESS NOTES
Referring Provider:                    Ashly Walker MD     Additional Provider(s):              Marla Miller PA-C    Reason for Referral:  Alok Don had genetic testing performed on 11/28/23 because of a new diagnosis of breast cancer at age 77 and a personal history of ipsilateral breast cancer at age 50. Genetic Testing Result:  No known pathogenic variants were found in the following 9 genes: NELL, BRCA1, BRCA2, CDH1, CHEK2, PALB2, PTEN, STK11, and TP53. Please refer to the report from [facility] ([id number]) for additional testing information. These results were discussed with Ms. Keyon Chen by phone on 12/05/23. Summary and Plan:  These results indicate that it is unlikely that Ms. Keyon Chen has a pathogenic variant in any of the genes listed above. The limitations of the testing include the chance that a pathogenic variant in a gene other than those included in this analysis might be the cause of cancer in Ms. Keyon Chen or her relatives. Reflex testing is pending. Addendum (12/11/23): Ms. Ferdinand Kerr sample was re-requisitioned to include additional cancer genes. No known pathogenic variants were identified in 48 genes including: APC*, NELL*, AXIN2, BAP1, BARD1, BMPR1A, BRCA1, BRCA2, BRIP1, CDH1, CDK4, CDKN2A (p14ARF), CDKN2A ([id number]), CHEK2, CTNNA1, DICER1*, EPCAM*, FH*, GREM1*, HOXB13, KIT, MBD4, MEN1*, MLH1*, MSH2*, MSH3*, MSH6*, MUTYH, NF1*, NTHL1, PALB2, PDGFRA, PMS2*, POLD1*, POLE, PTEN*, RAD51C, RAD51D, SDHA*, SDHB, SDHC*, SDHD, SMAD4, SMARCA4, STK11, TP53, TSC1*, TSC2, VHL. Please refer to the report from [facility] ([id number]) for additional testing information. These results were discussed with Ms. Keyon Chen by phone on 12/11/23. The etiology of Ms. Condon cancer remains unexplained. The limitations of the testing include the chance that a pathogenic variant in a gene other than those included in this panel might be the cause of cancer in Ms. Keyon Chen or her relatives.  Ms. Mirna Meek should contact me on an annual basis to learn if there have been any updates in genetic testing that would apply to her. In the meantime, Ms. Mirna Meek and her relatives should speak with their physicians to discuss recommended medical management according to their personal and family history.     Cc:  Zahraa Espinosa

## 2023-12-05 NOTE — TELEPHONE ENCOUNTER
LMOVMTCB in regards to offering her the pre op mastectomy class on Wednesdays before her upcoming mastectomy surgery on January 8, 2024 with Dr. Aramis Almaguer. Instructed her to call the breast nurse navigators to schedule and provided contact information.

## 2023-12-05 NOTE — TELEPHONE ENCOUNTER
S/w with Bushnell hills, RN from  [employer] to clarify that Dr Anastasiya Garrido did request medical clearance before patient's surgery.   Letter and clinicals faxed to [phone]

## 2023-12-06 ENCOUNTER — TELEPHONE (OUTPATIENT)
Dept: HEMATOLOGY/ONCOLOGY | Facility: HOSPITAL | Age: 68
End: 2023-12-06

## 2023-12-06 RX ORDER — SODIUM BICARBONATE 650 MG/1
1000 TABLET ORAL DAILY
COMMUNITY

## 2023-12-06 NOTE — TELEPHONE ENCOUNTER
Pt is calling to make a consult appt with Dr Low Michelle for breast cancer.  Referred by Dr Lisa Wilson and pt has Brittani Casarez

## 2023-12-07 ENCOUNTER — TELEPHONE (OUTPATIENT)
Dept: HEMATOLOGY/ONCOLOGY | Facility: HOSPITAL | Age: 68
End: 2023-12-07

## 2023-12-07 ENCOUNTER — TELEPHONE (OUTPATIENT)
Facility: LOCATION | Age: 68
End: 2023-12-07

## 2023-12-07 DIAGNOSIS — C50.911 MALIGNANT NEOPLASM OF RIGHT FEMALE BREAST, UNSPECIFIED ESTROGEN RECEPTOR STATUS, UNSPECIFIED SITE OF BREAST (HCC): Primary | ICD-10-CM

## 2023-12-07 NOTE — TELEPHONE ENCOUNTER
Called patient back in regards to her VM. She stated that she is wanting to pursue a bilateral mastectomy with her upcoming surgery with Dr. Gagandeep Brito. I stated I received her VM and messaged Dr. Gagandeep Brito and Dr. Mayra Jacobo surgery schedulers and they are aware of the change to her surgery. She thanked me for the phone call back and assistance. Pt was provided with the breast nurse navigators contact information and was encouraged to phone with any other questions or concerns.

## 2024-01-03 ENCOUNTER — NURSE NAVIGATOR ENCOUNTER (OUTPATIENT)
Dept: HEMATOLOGY/ONCOLOGY | Facility: HOSPITAL | Age: 69
End: 2024-01-03

## 2024-01-03 ENCOUNTER — APPOINTMENT (OUTPATIENT)
Dept: HEMATOLOGY/ONCOLOGY | Facility: HOSPITAL | Age: 69
End: 2024-01-03
Attending: SURGERY
Payer: MEDICARE

## 2024-01-03 NOTE — PROGRESS NOTES
Patient and support person attended pre-operative mastectomy course in the cancer center.  Discussed what to expect on day of surgery, PAT phone calls, sentinel lymph node mapping procedure, mastectomy bra and dressings, drain care and measurement on drain log, pain control, medication and constipation prevention, lymphedema awareness, post-op exercises and provided samples of various styles of breast prosthesis.  During class was provided ample hands-on time to practice stripping CALLUM drain tubing, reviewing drainage cup and reattaching CALLUM drain to mastectomy bras.    Patient was provided a bag with heart-shaped splinting pillow, drain lanyard, gauze/kerlix, bio patch, tegaderm, abd pads and drainage cup.  Resources provided for [employer], post-operative resources, drain log and mastectomy education.  Pathology and follow-up timelines reviewed.  Re-enforced teaching regarding emergency care and when to contact surgeon's office.    After class emailed PowerPoint slides, drain care video and supplemental information from the [employer]. Patient was given navigator contact information and encouraged to reach out with any other questions or concerns.

## 2024-01-04 ENCOUNTER — TELEPHONE (OUTPATIENT)
Facility: LOCATION | Age: 69
End: 2024-01-04

## 2024-01-07 ENCOUNTER — ANESTHESIA EVENT (OUTPATIENT)
Dept: SURGERY | Facility: HOSPITAL | Age: 69
End: 2024-01-07
Payer: MEDICARE

## 2024-01-08 ENCOUNTER — HOSPITAL ENCOUNTER (OUTPATIENT)
Facility: HOSPITAL | Age: 69
Setting detail: HOSPITAL OUTPATIENT SURGERY
Discharge: HOME | End: 2024-01-08
Attending: SURGERY | Admitting: SURGERY
Payer: MEDICARE

## 2024-01-08 ENCOUNTER — ANESTHESIA (OUTPATIENT)
Dept: SURGERY | Facility: HOSPITAL | Age: 69
End: 2024-01-08
Payer: MEDICARE

## 2024-01-08 VITALS
HEART RATE: 66 BPM | HEIGHT: 63 IN | BODY MASS INDEX: 24.45 KG/M2 | RESPIRATION RATE: 16 BRPM | TEMPERATURE: 98 F | OXYGEN SATURATION: 97 % | WEIGHT: 138 LBS | SYSTOLIC BLOOD PRESSURE: 106 MMHG | DIASTOLIC BLOOD PRESSURE: 70 MMHG

## 2024-01-08 DIAGNOSIS — C50.911 MALIGNANT NEOPLASM OF RIGHT FEMALE BREAST, UNSPECIFIED ESTROGEN RECEPTOR STATUS, UNSPECIFIED SITE OF BREAST (HCC): Primary | ICD-10-CM

## 2024-01-08 DIAGNOSIS — I48.0 PAROXYSMAL ATRIAL FIBRILLATION (HCC): ICD-10-CM

## 2024-01-08 LAB
HBV SURFACE AG SER-ACNC: <0.1 [IU]/L
HBV SURFACE AG SERPL QL IA: NONREACTIVE
HCV AB SERPL QL IA: NONREACTIVE
HIV 1+2 AB+HIV1 P24 AG SERPL QL IA: NONREACTIVE

## 2024-01-08 PROCEDURE — 88307 TISSUE EXAM BY PATHOLOGIST: CPT | Performed by: SURGERY

## 2024-01-08 PROCEDURE — 87340 HEPATITIS B SURFACE AG IA: CPT | Performed by: PREVENTIVE MEDICINE

## 2024-01-08 PROCEDURE — 86803 HEPATITIS C AB TEST: CPT | Performed by: PREVENTIVE MEDICINE

## 2024-01-08 PROCEDURE — 86701 HIV-1ANTIBODY: CPT | Performed by: PREVENTIVE MEDICINE

## 2024-01-08 PROCEDURE — 0HTV0ZZ RESECTION OF BILATERAL BREAST, OPEN APPROACH: ICD-10-PCS | Performed by: SURGERY

## 2024-01-08 PROCEDURE — 76942 ECHO GUIDE FOR BIOPSY: CPT | Performed by: STUDENT IN AN ORGANIZED HEALTH CARE EDUCATION/TRAINING PROGRAM

## 2024-01-08 RX ORDER — LIDOCAINE HYDROCHLORIDE 10 MG/ML
INJECTION, SOLUTION EPIDURAL; INFILTRATION; INTRACAUDAL; PERINEURAL AS NEEDED
Status: DISCONTINUED | OUTPATIENT
Start: 2024-01-08 | End: 2024-01-08 | Stop reason: SURG

## 2024-01-08 RX ORDER — PHENYLEPHRINE HCL 10 MG/ML
VIAL (ML) INJECTION AS NEEDED
Status: DISCONTINUED | OUTPATIENT
Start: 2024-01-08 | End: 2024-01-08 | Stop reason: SURG

## 2024-01-08 RX ORDER — GLYCOPYRROLATE 0.2 MG/ML
INJECTION, SOLUTION INTRAMUSCULAR; INTRAVENOUS AS NEEDED
Status: DISCONTINUED | OUTPATIENT
Start: 2024-01-08 | End: 2024-01-08 | Stop reason: SURG

## 2024-01-08 RX ORDER — SODIUM CHLORIDE, SODIUM LACTATE, POTASSIUM CHLORIDE, CALCIUM CHLORIDE 600; 310; 30; 20 MG/100ML; MG/100ML; MG/100ML; MG/100ML
INJECTION, SOLUTION INTRAVENOUS CONTINUOUS
Status: DISCONTINUED | OUTPATIENT
Start: 2024-01-08 | End: 2024-01-08

## 2024-01-08 RX ORDER — HYDROMORPHONE HYDROCHLORIDE 1 MG/ML
0.6 INJECTION, SOLUTION INTRAMUSCULAR; INTRAVENOUS; SUBCUTANEOUS EVERY 5 MIN PRN
Status: DISCONTINUED | OUTPATIENT
Start: 2024-01-08 | End: 2024-01-08

## 2024-01-08 RX ORDER — NEOSTIGMINE METHYLSULFATE 1 MG/ML
INJECTION, SOLUTION INTRAVENOUS AS NEEDED
Status: DISCONTINUED | OUTPATIENT
Start: 2024-01-08 | End: 2024-01-08 | Stop reason: SURG

## 2024-01-08 RX ORDER — NALOXONE HYDROCHLORIDE 0.4 MG/ML
80 INJECTION, SOLUTION INTRAMUSCULAR; INTRAVENOUS; SUBCUTANEOUS AS NEEDED
Status: DISCONTINUED | OUTPATIENT
Start: 2024-01-08 | End: 2024-01-08

## 2024-01-08 RX ORDER — ONDANSETRON 2 MG/ML
INJECTION INTRAMUSCULAR; INTRAVENOUS AS NEEDED
Status: DISCONTINUED | OUTPATIENT
Start: 2024-01-08 | End: 2024-01-08 | Stop reason: SURG

## 2024-01-08 RX ORDER — LABETALOL HYDROCHLORIDE 5 MG/ML
5 INJECTION, SOLUTION INTRAVENOUS EVERY 5 MIN PRN
Status: DISCONTINUED | OUTPATIENT
Start: 2024-01-08 | End: 2024-01-08

## 2024-01-08 RX ORDER — HYDROCODONE BITARTRATE AND ACETAMINOPHEN 5; 325 MG/1; MG/1
2 TABLET ORAL ONCE AS NEEDED
Status: DISCONTINUED | OUTPATIENT
Start: 2024-01-08 | End: 2024-01-08

## 2024-01-08 RX ORDER — ACETAMINOPHEN 500 MG
1000 TABLET ORAL ONCE AS NEEDED
Status: DISCONTINUED | OUTPATIENT
Start: 2024-01-08 | End: 2024-01-08

## 2024-01-08 RX ORDER — ACETAMINOPHEN 500 MG
1000 TABLET ORAL ONCE
Status: DISCONTINUED | OUTPATIENT
Start: 2024-01-08 | End: 2024-01-08 | Stop reason: HOSPADM

## 2024-01-08 RX ORDER — DEXAMETHASONE SODIUM PHOSPHATE 4 MG/ML
VIAL (ML) INJECTION AS NEEDED
Status: DISCONTINUED | OUTPATIENT
Start: 2024-01-08 | End: 2024-01-08 | Stop reason: SURG

## 2024-01-08 RX ORDER — HYDROCODONE BITARTRATE AND ACETAMINOPHEN 5; 325 MG/1; MG/1
1 TABLET ORAL ONCE AS NEEDED
Status: DISCONTINUED | OUTPATIENT
Start: 2024-01-08 | End: 2024-01-08

## 2024-01-08 RX ORDER — CEFADROXIL 500 MG/1
500 CAPSULE ORAL 2 TIMES DAILY
Qty: 20 CAPSULE | Refills: 0 | Status: SHIPPED | OUTPATIENT
Start: 2024-01-08 | End: 2024-01-18

## 2024-01-08 RX ORDER — MIDAZOLAM HYDROCHLORIDE 1 MG/ML
1 INJECTION INTRAMUSCULAR; INTRAVENOUS EVERY 5 MIN PRN
Status: DISCONTINUED | OUTPATIENT
Start: 2024-01-08 | End: 2024-01-08

## 2024-01-08 RX ORDER — HYDROMORPHONE HYDROCHLORIDE 1 MG/ML
0.4 INJECTION, SOLUTION INTRAMUSCULAR; INTRAVENOUS; SUBCUTANEOUS EVERY 5 MIN PRN
Status: DISCONTINUED | OUTPATIENT
Start: 2024-01-08 | End: 2024-01-08

## 2024-01-08 RX ORDER — ROCURONIUM BROMIDE 10 MG/ML
INJECTION, SOLUTION INTRAVENOUS AS NEEDED
Status: DISCONTINUED | OUTPATIENT
Start: 2024-01-08 | End: 2024-01-08 | Stop reason: SURG

## 2024-01-08 RX ORDER — HYDROCODONE BITARTRATE AND ACETAMINOPHEN 5; 325 MG/1; MG/1
1 TABLET ORAL EVERY 6 HOURS PRN
Qty: 10 TABLET | Refills: 0 | Status: SHIPPED | OUTPATIENT
Start: 2024-01-08

## 2024-01-08 RX ORDER — CEFAZOLIN SODIUM/WATER 2 G/20 ML
2 SYRINGE (ML) INTRAVENOUS ONCE
Status: COMPLETED | OUTPATIENT
Start: 2024-01-08 | End: 2024-01-08

## 2024-01-08 RX ORDER — METOCLOPRAMIDE HYDROCHLORIDE 5 MG/ML
INJECTION INTRAMUSCULAR; INTRAVENOUS AS NEEDED
Status: DISCONTINUED | OUTPATIENT
Start: 2024-01-08 | End: 2024-01-08 | Stop reason: SURG

## 2024-01-08 RX ORDER — HYDROMORPHONE HYDROCHLORIDE 1 MG/ML
0.2 INJECTION, SOLUTION INTRAMUSCULAR; INTRAVENOUS; SUBCUTANEOUS EVERY 5 MIN PRN
Status: DISCONTINUED | OUTPATIENT
Start: 2024-01-08 | End: 2024-01-08

## 2024-01-08 RX ADMIN — SODIUM CHLORIDE, SODIUM LACTATE, POTASSIUM CHLORIDE, CALCIUM CHLORIDE: 600; 310; 30; 20 INJECTION, SOLUTION INTRAVENOUS at 09:50:00

## 2024-01-08 RX ADMIN — DEXAMETHASONE SODIUM PHOSPHATE 4 MG: 4 MG/ML VIAL (ML) INJECTION at 09:55:00

## 2024-01-08 RX ADMIN — LIDOCAINE HYDROCHLORIDE 50 MG: 10 INJECTION, SOLUTION EPIDURAL; INFILTRATION; INTRACAUDAL; PERINEURAL at 09:55:00

## 2024-01-08 RX ADMIN — PHENYLEPHRINE HCL 100 MCG: 10 MG/ML VIAL (ML) INJECTION at 10:00:00

## 2024-01-08 RX ADMIN — PHENYLEPHRINE HCL 100 MCG: 10 MG/ML VIAL (ML) INJECTION at 10:20:00

## 2024-01-08 RX ADMIN — ONDANSETRON 4 MG: 2 INJECTION INTRAMUSCULAR; INTRAVENOUS at 11:50:00

## 2024-01-08 RX ADMIN — PHENYLEPHRINE HCL 100 MCG: 10 MG/ML VIAL (ML) INJECTION at 09:55:00

## 2024-01-08 RX ADMIN — PHENYLEPHRINE HCL 100 MCG: 10 MG/ML VIAL (ML) INJECTION at 10:45:00

## 2024-01-08 RX ADMIN — PHENYLEPHRINE HCL 100 MCG: 10 MG/ML VIAL (ML) INJECTION at 10:50:00

## 2024-01-08 RX ADMIN — NEOSTIGMINE METHYLSULFATE 5 MG: 1 INJECTION, SOLUTION INTRAVENOUS at 11:45:00

## 2024-01-08 RX ADMIN — ROCURONIUM BROMIDE 50 MG: 10 INJECTION, SOLUTION INTRAVENOUS at 09:55:00

## 2024-01-08 RX ADMIN — PHENYLEPHRINE HCL 100 MCG: 10 MG/ML VIAL (ML) INJECTION at 10:15:00

## 2024-01-08 RX ADMIN — CEFAZOLIN SODIUM/WATER 2 G: 2 G/20 ML SYRINGE (ML) INTRAVENOUS at 10:10:00

## 2024-01-08 RX ADMIN — METOCLOPRAMIDE HYDROCHLORIDE 10 MG: 5 INJECTION INTRAMUSCULAR; INTRAVENOUS at 09:55:00

## 2024-01-08 RX ADMIN — GLYCOPYRROLATE 0.8 MG: 0.2 INJECTION, SOLUTION INTRAMUSCULAR; INTRAVENOUS at 11:45:00

## 2024-01-08 NOTE — DISCHARGE INSTRUCTIONS
Home Care Instructions for Breast Surgery  Dr. Kusum Sandhu    MEDICATIONS  For post-operative pain control the mediations are usually over the counter preparations such as Advil and Tylenol.  For severe pain the patient may take the prescribed Norco or Tylenol #3, which are narcotic pain medications.  The patient may also overlap Advil with Tylenol, Tylenol #3 or Norco.  The patient can do this by taking two Tylenol, then three hours later taking two Advil, then three hours later taking Tylenol again.  You may resume taking your blood thinner on Wednesday, January 10.  All other home medications may be resumed as scheduled.  Generally, aspirin is avoided for ten days.    DIET  The patient may resume a general diet immediately.  There should be no alcohol consumption in the immediate recover time period.  If the patient was sedated for the procedure the first meal should be light.    WOUND CARE  The top dressings may be removed the day after surgery.  This includes the gauze, tape and band-aids if they are present.  Do not remove the steri-strips or butterfly tapes that are white and adherent to the skin.  The steri-strips will eventually peel up at the ends and at this point they may be removed.  This is usually seven to ten days after surgery.  The patient may shower the day after surgery.  There is no need to cover the incisions and all top gauze type dressings should be removed prior to showering.  Soap can get on the wounds but do not scrub over the wounds.  No hair dye or chemicals of any kind should get in the wounds.  Most wounds will be closed with dissolving suture underneath the skin.  These sutures will dissolve on their own.    Measure and record your drain output twice a day.  Bring the record with you to your appointment with Dr. Sandhu.    ACTIVITY  The patient may ride in a car but should not drive the car for at least overnight if sedation was used.  If no sedation was used the patient may  drive immediately.  The patient may return to work the next day.  Avoid any activity that could lead to the breast getting elbowed or bumped.  Avoid bending, pushing, pulling and lifting anything heavier than 25 pounds for two weeks.  No jogging or workouts for two weeks.  Fast walking and using a treadmill at less than 3.5 miles per hour in the flat position is acceptable.  Patients should seek further activity limits at the time of their appointment.  Patients should wear a supportive bra, even at night, for two weeks.  The best support is with a sports bra.  No golfing, tennis, racquetball, volleyball, or extreme sports for two weeks.    APPOINTMENT  Nathalie Mullen or Pema Acosta will call you to set up an appointment in 5-7 days after surgery.  This is when the pathology results should be available.  If the wound turns red, hot, swollen, becomes increasingly painful, or drains pus call us immediately at [phone].  Bleeding requiring a return to the operating room happens two to three percent of the time.  Minor bleeding from the incision is expected.  A significant bruise can also be expected.  Severe swelling of the breast with pain, bluish discoloration, or the passage of blood clots through the incision is an indication for bleeding.  The number listed above is our office number.  Our phone automatically switches to our answering service if we are not there.  For non-emergent care please call our office at 8:30 a.m. Monday through Friday. For emergencies please call us at any time.    Thank you for entrusting us with your care.  [facility]General Surgery     Drain Record Sheet    Date Time Drain #1 Drain #2                                                                                         HOW TO EMPTY YOUR DRAIN    Wash hands with soap and water  Hold drain so plug is upright and release it  Turn drain upside down into measuring container and squeeze drain until all the liquid is removed  While squeezing  the drain, replace the plug into drain  Measure the liquid and record the amount in your diary twice a day  Be sure to bring your diary to your next visit with the doctor

## 2024-01-08 NOTE — ANESTHESIA PROCEDURE NOTES
Airway  Date/Time: 1/8/2024 9:57 AM  Urgency: elective    Airway not difficult    General Information and Staff    Patient location during procedure: OR  Anesthesiologist: Bacilio Nolasco MD  Performed: anesthesiologist   Performed by: Bacilio Nolasco MD  Authorized by: Bacilio Nolasco MD      Indications and Patient Condition  Indications for airway management: anesthesia  Sedation level: deep  Preoxygenated: yes  Patient position: sniffing  Mask difficulty assessment: 1 - vent by mask    Final Airway Details  Final airway type: endotracheal airway      Successful airway: ETT  Cuffed: yes   Successful intubation technique: direct laryngoscopy  Facilitating devices/methods: intubating stylet and anterior pressure/BURP  Endotracheal tube insertion site: oral  Blade: Andre  Blade size: #3  ETT size (mm): 7.0    Cormack-Lehane Classification: grade IIA - partial view of glottis  Placement verified by: capnometry   Measured from: lips  ETT to lips (cm): 21  Number of attempts at approach: 1  Number of other approaches attempted: 0

## 2024-01-08 NOTE — ANESTHESIA PROCEDURE NOTES
Regional Block    Date/Time: 1/8/2024 9:58 AM    Performed by: Bacilio Nolasco MD  Authorized by: Bacilio Nolasco MD      General Information and Staff    Start Time:  1/8/2024 9:58 AM  End Time:  1/8/2024 10:03 AM  Anesthesiologist:  Bacilio Nolasco MD  Performed by:  Anesthesiologist  Patient Location:  OR    Block Placement: Post Induction  Site Identification: real time ultrasound guided and image stored and retrievable    Block site/laterality marked before start: site marked  Reason for Block: at surgeon's request and post-op pain management    Preanesthetic Checklist: 2 patient identifers, IV checked, risks and benefits discussed, monitors and equipment checked, pre-op evaluation, timeout performed, anesthesia consent, sterile technique used, no prohibitive neurological deficits and no local skin infection at insertion site      Procedure Details    Patient Position:  Supine  Prep: ChloraPrep    Monitoring:  Cardiac monitor, continuous pulse ox and blood pressure cuff  Block Type:  PEC1 and PEC2  Laterality:  Bilateral  Injection Technique:  Single-shot    Needle    Needle Type:  Short-bevel and echogenic  Needle Gauge:  21 G  Needle Length:  100 mm  Needle Localization:  Ultrasound guidance  Reason for Ultrasound Use: appropriate spread of the medication was noted in real time and no ultrasound evidence of intravascular and/or intraneural injection            Assessment    Injection Assessment:  Good spread noted, negative resistance, negative aspiration for heme, incremental injection, low pressure and no pain on injection  Paresthesia Pain:  None  Heart Rate Change: No    - Patient tolerated block procedure well without evidence of immediate block related complications.     Medications  1/8/2024 9:58 AM      Additional Comments    Medication:  Ropivacaine 0.25% 20 mL for PEC II block (on each side), 10 mL for PEC I block (on each side)

## 2024-01-08 NOTE — OPERATIVE REPORT
[facility]  Op Note    Nidia Ricci Location: OR   [facility] [id number] MRN [id number]   Admission Date 1/8/2024 Operation Date 1/8/2024   Attending Physician Kusum Sandhu MD Operating Physician Kusum Sandhu MD     DATE OF OPERATION:  1/8/2024  PREOPERATIVE DIAGNOSIS: Right breast invasive ductal carcinoma, upper outer quadrant, ER positive  POSTOPERATIVE DIAGNOSIS: Right breast invasive ductal carcinoma, upper outer quadrant, ER positive  PROCEDURE PERFORMED: Bilateral simple mastectomy.    ANESTHESIA: Gen    SURGEON:  Kusum Sandhu M.D.  ASSISTANT: Mary Grace Sena PA-C    SPECIMEN:  Left breast mastectomy  Right breast mastectomy    ESTIMATED BLOOD LOSS: 20 mL.    COMPLICATIONS: None.    INDICATIONS: The patient is a 68-year-old female with past history of right breast cancer treated with lumpectomy and axillary lymph node dissection, radiation therapy, and tamoxifen.  She recently had an abnormality on her mammogram.  Biopsy revealed invasive ductal carcinoma in the upper outer quadrant of the right breast.  The patient decided to undergo bilateral mastectomy.  The risks, benefits, alternatives were discussed in detail with the patient. Risks included but not limited to, seroma development, infection and bleeding.  The patient is agreeable to proceed with the operation.  PROCEDURE: After informed consent was obtained, the patient was taken to the operating room where general anesthesia was induced.  The patient's breasts and axillae were prepped and draped in the usual sterile fashion.  Attention was then turned to the left breast.  An elliptical incision was made to encompass the patient's breast.  Cautery was used to obtain hemostasis.  Careful dissection was then used to dissect tissue flaps going superiorly, medially, inferiorly, and laterally.  The breast tissue was then grasped and excised off the underlying pectoralis muscle using cautery.  The specimen was marked with a stitch in the  axillary tail.  Cautery was used to obtain hemostasis.  The wound was irrigated with normal saline.  A 19 Jamaican Landon drain was passed out inferior and lateral to the incision.  It was secured with a 2-0 nylon.  The skin incision was then closed in 2 layers, using a 3-0 Vicryl in the deep layer and a 4-0 Vicryl in the subcuticular skin.  Attention was then turned to the right breast.  An elliptical incision was made to encompass the patient's breast.  Care was taken to include the patient's prior lumpectomy cavity, which was in the upper inner quadrant of her breast.  Cautery was used to obtain hemostasis.  Careful dissection was then used to dissect tissue flaps going superiorly, medially, inferiorly, and laterally.  The breast tissue was then grasped and excised off the underlying pectoralis muscle using cautery.  The specimen was marked with a stitch in the axillary tail.  Cautery was used to obtain hemostasis.  The wound was irrigated with normal saline.  A 19 Jamaican Landon drain was passed out inferior and lateral to the incision.  It was secured with a 2-0 nylon.  The skin incision was then closed in 2 layers, using a 3-0 Vicryl in the deep layer and a 4-0 Vicryl in the subcuticular skin.  Steri-Strips were placed across the incisions as well as sterile dressings.  The patient tolerated the procedure well.  She was extubated in the OR and transferred to the PACU in good condition.  Kusum Sandhu MD

## 2024-01-08 NOTE — ANESTHESIA POSTPROCEDURE EVALUATION
[facility] Patient Status:  Hospital Outpatient Surgery   Age/Gender 68 year old female MRN [id number]   Location [facility] PERIOPERATIVE SERVICE Attending Kusum Sandhu MD   Hosp Day # 0 PCP [facility]       Anesthesia Post-op Note    BILATERAL BREAST MASTECTOMY    Procedure Summary       Date: 01/08/24 Room / Location:  MAIN OR 04 / [facility] MAIN OR    Anesthesia Start: 0950 Anesthesia Stop: 1221    Procedure: BILATERAL BREAST MASTECTOMY (Bilateral: Breast) Diagnosis:       Malignant neoplasm of right female breast, unspecified estrogen receptor status, unspecified site of breast (HCC)      (Malignant neoplasm of right female breast, unspecified estrogen receptor status, unspecified site of breast (HCC) [C50.911])    Surgeons: Kusum Sandhu MD Anesthesiologist: Bacilio Nolasco MD    Anesthesia Type: general ASA Status: 2            Anesthesia Type: general    Vitals Value Taken Time   /87 01/08/24 1225   Temp 97 °F (36.1 °C) 01/08/24 1225   Pulse 67 01/08/24 1225   Resp 16 01/08/24 1225   SpO2 94 % 01/08/24 1225   Vitals shown include unfiled device data.    Patient Location: PACU    Anesthesia Type: general    Airway Patency: patent and extubated    Postop Pain Control: adequate    Mental Status: mildly sedated but able to meaningfully participate in the post-anesthesia evaluation    Nausea/Vomiting: none    Cardiopulmonary/Hydration status: stable euvolemic    Complications: no apparent anesthesia related complications    Postop vital signs: stable    Dental Exam: Unchanged from Preop    Patient to be discharged from PACU when criteria met.

## 2024-01-08 NOTE — ANESTHESIA PREPROCEDURE EVALUATION
PRE-OP EVALUATION    Patient Name: Nidia Ricci    Admit Diagnosis: Malignant neoplasm of right female breast, unspecified estrogen receptor status, unspecified site of breast (HCC) [C50.911]    Pre-op Diagnosis: Malignant neoplasm of right female breast, unspecified estrogen receptor status, unspecified site of breast (HCC) [C50.911]    BILATERAL BREAST MASTECTOMY    Anesthesia Procedure: BILATERAL BREAST MASTECTOMY (Bilateral)    Surgeon(s) and Role:     * Kusum Sandhu MD - Primary    Pre-op vitals reviewed.  Temp: 97.4 °F (36.3 °C)  Pulse: 70  Resp: 14  BP: 138/92  SpO2: 99 %  Body mass index is 24.45 kg/m².    Current medications reviewed.  Hospital Medications:   acetaminophen (Tylenol Extra Strength) tab 1,000 mg  1,000 mg Oral Once    lactated ringers infusion   Intravenous Continuous    ceFAZolin (Ancef) 2 g in 20mL IV syringe premix  2 g Intravenous Once       Outpatient Medications:     Medications Prior to Admission   Medication Sig Dispense Refill Last Dose    Cyanocobalamin (VITAMIN B 12) 500 MCG Oral Tab Take 1,000 mcg by mouth daily.   12/25/2023    Rivaroxaban (XARELTO) 20 MG Oral Tab TAKE ONE TABLET BY MOUTH DAILY WITH FOOD 90 tablet 3 1/4/2024 at 2100    dronedarone HCl (MULTAQ) 400 MG Oral Tab Take 1 tablet (400 mg total) by mouth 2 (two) times daily. 180 tablet 3 1/8/2024 at 0500    ATORVASTATIN 10 MG Oral Tab TAKE 1 TABLET BY MOUTH EVERY NIGHT AT BEDTIME 90 tablet 3 1/7/2024 at 2100    escitalopram 10 MG Oral Tab   0 1/7/2024 at 2100    Coenzyme Q10 (CO Q-10) 100 MG Oral Cap Take by mouth daily.   12/25/2023    Levothyroxine Sodium (SYNTHROID) 50 MCG Oral Tab Take 1 tablet (50 mcg total) by mouth before breakfast.  2 1/7/2024 at 0800    Ergocalciferol (VITAMIN D OR) Take 400 Units by mouth daily.   12/25/2023       Allergies: Patient has no known allergies.      Anesthesia Evaluation    Patient summary reviewed.    Anesthetic Complications  (+) history of anesthetic  complications  History of: PONV       GI/Hepatic/Renal    Negative GI/hepatic/renal ROS.                             Cardiovascular        Exercise tolerance: good     MET: >4      (+) hypertension   (+) hyperlipidemia               (+) dysrhythmias and atrial fibrillation                  Endo/Other    Negative endo/other ROS.                              Pulmonary    Negative pulmonary ROS.                       Neuro/Psych             (+) TIA                         Past Surgical History:   Procedure Laterality Date    HYSTERECTOMY      OTHER SURGICAL HISTORY Right     Lumpectomy    THYROIDECTOMY      Partial     Social History     Socioeconomic History    Marital status:     Number of children: 1   Tobacco Use    Smoking status: Never    Smokeless tobacco: Never   Vaping Use    Vaping Use: Never used   Substance and Sexual Activity    Alcohol use: Yes     Alcohol/week: 0.0 standard drinks of alcohol     Comment: Rare glass of wine/occassional    Drug use: No   Other Topics Concern    Caffeine Concern Yes     Comment: 1-2 large cups daily     History   Drug Use No     Available pre-op labs reviewed.               Airway      Mallampati: II  Mouth opening: >3 FB  TM distance: > 6 cm  Neck ROM: full Cardiovascular    Cardiovascular exam normal.  Rhythm: regular  Rate: normal     Dental    Dentition appears grossly intact         Pulmonary    Pulmonary exam normal.  Breath sounds clear to auscultation bilaterally.               Other findings              ASA: 2   Plan: general  NPO status verified and patient meets guidelines.  Patient has not taken beta blockers in last 24 hours.  Post-procedure pain management plan discussed with surgeon and patient.  Surgeon requests: regional block  Comment: I spoke with the patient and discussed the risks of general anesthesia, which include nausea and vomiting; sore throat; injury to the lips, gums, teeth, and eyes; cardiac, pulmonary, and neurologic events;  aspiration; and allergic reactions. The patient understands these risks and consents to receiving general anesthesia for this procedure.  Plan/risks discussed with: patient and spouse                Present on Admission:  **None**

## 2024-01-08 NOTE — H&P
History & Physical Examination    Patient Name: Nidia Ricci  MRN: [id number]  [facility]: [id number]  YOB: 1955    Diagnosis: Recurrent right breast cancer    Present Illness: The patient is a 68-year-old female seen at the request of her primary care physician regarding a new diagnosis of right breast cancer.  The patient has a prior history of right-sided breast cancer, treated in November 2000 in [place].  At that time, she underwent lumpectomy with axillary lymph node dissection.  She had 35 sessions of radiation therapy.  She then took tamoxifen for 2 years and then was switched to another medication for 2-1/2 years.  At the age of 46, since her cancer was driven by estrogen, she underwent a total hysterectomy with bilateral oophorectomy.  The patient states she had significant hot flashes after starting tamoxifen that lasted 15 years.     She has no family history of breast, ovarian, or prostate cancer.  She has been pregnant once, at the age of 31.  She has 1 daughter.  She did not breast-feed her daughter.  She went through menarche at the age of 13 and menopause after her hysterectomy at the age of 46.     The patient does have a history of atrial fibrillation and is on Xarelto.    Medications Prior to Admission   Medication Sig Dispense Refill Last Dose    Cyanocobalamin (VITAMIN B 12) 500 MCG Oral Tab Take 1,000 mcg by mouth daily.   12/25/2023    Rivaroxaban (XARELTO) 20 MG Oral Tab TAKE ONE TABLET BY MOUTH DAILY WITH FOOD 90 tablet 3 1/4/2024 at 2100    dronedarone HCl (MULTAQ) 400 MG Oral Tab Take 1 tablet (400 mg total) by mouth 2 (two) times daily. 180 tablet 3 1/8/2024 at 0500    ATORVASTATIN 10 MG Oral Tab TAKE 1 TABLET BY MOUTH EVERY NIGHT AT BEDTIME 90 tablet 3 1/7/2024 at 2100    escitalopram 10 MG Oral Tab   0 1/7/2024 at 2100    Coenzyme Q10 (CO Q-10) 100 MG Oral Cap Take by mouth daily.   12/25/2023    Levothyroxine Sodium (SYNTHROID) 50 MCG Oral Tab Take 1 tablet (50 mcg total) by  mouth before breakfast.  2 1/7/2024 at 0800    Ergocalciferol (VITAMIN D OR) Take 400 Units by mouth daily.   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    Anxiety     Arrhythmia     Arthritis     Atrial fibrillation (HCC)     Back pain     Blurred vision     Breast cancer (HCC) 11/2000    right breast    Disorder of thyroid     Easy bruising     Exposure to medical diagnostic radiation     completed Feb 2001    Fatigue     Feeling lonely     Heart palpitations     History of depression     History of rheumatic fever     History of stomach ulcers     Hypercholesterolemia     Hypertension     Mild tricuspid regurgitation     Night sweats     PONV (postoperative nausea and vomiting)     Rheumatic heart disease     Skin blushing/flushing     Sleep disturbance     Stress     Syncope     Thyroid disease     TIA (transient ischemic attack)     Visual impairment     glasses    Wears glasses      Past Surgical History:   Procedure Laterality Date    HYSTERECTOMY      OTHER SURGICAL HISTORY Right     Lumpectomy    THYROIDECTOMY      Partial     Family History   Problem Relation Age of Onset    Heart Attack Father     Alcohol and Other Disorders Associated Father     Hypertension Father     Heart Attack Other      Social History     Tobacco Use    Smoking status: Never    Smokeless tobacco: Never   Substance Use Topics    Alcohol use: Yes     Alcohol/week: 0.0 standard drinks of alcohol     Comment: Rare glass of wine/occassional       SYSTEM Check if Review is Normal Check if Physical Exam is Normal If  not normal, please explain:   HEENT [ x] [ x]    NECK & BACK [ x] [ x]    HEART [ ] [ ] afib   LUNGS [ x] [x ]    ABDOMEN [ x] [x ]    UROGENITAL [ x] [x ]    EXTREMITIES [x ] [x ]    OTHER   R breast cancer     [ x ] I have discussed the risks and benefits and alternatives with the patient/family.  They understand and agree to proceed with plan of care.  [ x ] I have reviewed the History and Physical done within the last 30 days.  Any changes noted above.    Kusum Sandhu MD  1/8/2024  9:33 AM

## 2024-01-09 ENCOUNTER — TELEPHONE (OUTPATIENT)
Dept: HEMATOLOGY/ONCOLOGY | Facility: HOSPITAL | Age: 69
End: 2024-01-09

## 2024-01-09 NOTE — TELEPHONE ENCOUNTER
Called patient, post op day #1. States that overall doing well, pain is well controlled. She has looked at incisions and no signs of infection. Managing drains well and documenting output on drain log.  Is aware of her surgical follow up appointment. Scheduled with medical oncology. Phone number provided and encouraged patient to call back with any questions or concerns.

## 2024-01-10 ENCOUNTER — MED REC SCAN ONLY (OUTPATIENT)
Facility: LOCATION | Age: 69
End: 2024-01-10

## 2024-01-16 ENCOUNTER — NURSE NAVIGATOR ENCOUNTER (OUTPATIENT)
Dept: HEMATOLOGY/ONCOLOGY | Facility: HOSPITAL | Age: 69
End: 2024-01-16

## 2024-01-16 ENCOUNTER — VIRTUAL PHONE E/M (OUTPATIENT)
Dept: HEMATOLOGY/ONCOLOGY | Facility: HOSPITAL | Age: 69
End: 2024-01-16
Attending: SURGERY
Payer: MEDICARE

## 2024-01-16 ENCOUNTER — OFFICE VISIT (OUTPATIENT)
Facility: LOCATION | Age: 69
End: 2024-01-16
Payer: MEDICARE

## 2024-01-16 ENCOUNTER — TELEPHONE (OUTPATIENT)
Dept: HEMATOLOGY/ONCOLOGY | Facility: HOSPITAL | Age: 69
End: 2024-01-16

## 2024-01-16 VITALS — TEMPERATURE: 98 F | HEART RATE: 81 BPM

## 2024-01-16 DIAGNOSIS — C50.911 RECURRENT BREAST CANCER, RIGHT (HCC): Primary | ICD-10-CM

## 2024-01-16 DIAGNOSIS — Z17.0 MALIGNANT NEOPLASM OF OVERLAPPING SITES OF RIGHT BREAST IN FEMALE, ESTROGEN RECEPTOR POSITIVE  (HCC): ICD-10-CM

## 2024-01-16 DIAGNOSIS — C50.811 MALIGNANT NEOPLASM OF OVERLAPPING SITES OF RIGHT BREAST IN FEMALE, ESTROGEN RECEPTOR POSITIVE  (HCC): ICD-10-CM

## 2024-01-16 DIAGNOSIS — Z78.0 POSTMENOPAUSAL: Primary | ICD-10-CM

## 2024-01-16 PROCEDURE — 99205 OFFICE O/P NEW HI 60 MIN: CPT | Performed by: INTERNAL MEDICINE

## 2024-01-16 PROCEDURE — 1160F RVW MEDS BY RX/DR IN RCRD: CPT | Performed by: SURGERY

## 2024-01-16 PROCEDURE — 1159F MED LIST DOCD IN RCRD: CPT | Performed by: SURGERY

## 2024-01-16 PROCEDURE — 99024 POSTOP FOLLOW-UP VISIT: CPT | Performed by: SURGERY

## 2024-01-16 NOTE — PROGRESS NOTES
Post Operative Visit Note       Active Problems  1. Recurrent breast cancer, right (HCC)         Chief Complaint   Chief Complaint   Patient presents with    Post-Op     PO- Breast   Pt. States fever of 101 for only the 1st day after surgery  Pt. Denies chills and denies diarrhea  Pt. States to have slight pain with movements          History of Present Illness   The patient is a 68-year-old female who underwent bilateral simple mastectomy for recurrent right breast cancer.  Her surgery was performed January 8, 2024 and she presents for follow-up.  The patient states she is healing well.  She is on 500 mg of Tylenol only to control her pain.  She has had minimal drain output.  She is very happy with the cosmetic result.    Left CALLUM drain: 23 cc of serosanguineous fluid over 24 hours  Left CALLUM drain: 24 cc of serosanguineous fluid over 24 hours    Allergies  Nidia has No Known Allergies.    Past Medical / Surgical / Social / Family History    The past medical and past surgical history have been reviewed by me today.     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    Anxiety     Arrhythmia     Arthritis     Atrial fibrillation (HCC)     Back pain     Blurred vision     Breast cancer (HCC) 11/2000    right breast    Disorder of thyroid     Easy bruising     Exposure to medical diagnostic radiation     completed Feb 2001    Fatigue     Feeling lonely     Heart palpitations     History of depression     History of rheumatic fever     History of stomach ulcers     Hypercholesterolemia     Hypertension     Mild tricuspid regurgitation     Night sweats     PONV (postoperative nausea and vomiting)     Rheumatic heart disease     Skin blushing/flushing     Sleep disturbance     Stress     Syncope     Thyroid disease     TIA (transient ischemic attack)     Visual impairment     glasses    Wears glasses      Past Surgical History:   Procedure  Laterality Date    HYSTERECTOMY      OTHER SURGICAL HISTORY Right     Lumpectomy    THYROIDECTOMY      Partial       The family history and social history have been reviewed by me today.    Family History   Problem Relation Age of Onset    Heart Attack Father     Alcohol and Other Disorders Associated Father     Hypertension Father     Heart Attack Other      Social History     Socioeconomic History    Marital status:     Number of children: 1   Tobacco Use    Smoking status: Never    Smokeless tobacco: Never   Vaping Use    Vaping Use: Never used   Substance and Sexual Activity    Alcohol use: Yes     Alcohol/week: 0.0 standard drinks of alcohol     Comment: Rare glass of wine/occassional    Drug use: No   Other Topics Concern    Caffeine Concern Yes     Comment: 1-2 large cups daily        Current Outpatient Medications:     rivaroxaban (XARELTO) 20 MG Oral Tab, TAKE ONE TABLET BY MOUTH DAILY WITH FOOD, Disp: , Rfl:     HYDROcodone-acetaminophen (NORCO) 5-325 MG Oral Tab, Take 1 tablet by mouth every 6 (six) hours as needed for Pain., Disp: 10 tablet, Rfl: 0    cefadroxil 500 MG Oral Cap, Take 1 capsule (500 mg total) by mouth 2 (two) times daily for 10 days., Disp: 20 capsule, Rfl: 0    Cyanocobalamin (VITAMIN B 12) 500 MCG Oral Tab, Take 1,000 mcg by mouth daily., Disp: , Rfl:     dronedarone HCl (MULTAQ) 400 MG Oral Tab, Take 1 tablet (400 mg total) by mouth 2 (two) times daily., Disp: 180 tablet, Rfl: 3    ATORVASTATIN 10 MG Oral Tab, TAKE 1 TABLET BY MOUTH EVERY NIGHT AT BEDTIME, Disp: 90 tablet, Rfl: 3    escitalopram 10 MG Oral Tab, , Disp: , Rfl: 0    Coenzyme Q10 (CO Q-10) 100 MG Oral Cap, Take by mouth daily., Disp: , Rfl:     Levothyroxine Sodium (SYNTHROID) 50 MCG Oral Tab, Take 1 tablet (50 mcg total) by mouth before breakfast., Disp: , Rfl: 2    Ergocalciferol (VITAMIN D OR), Take 400 Units by mouth daily., Disp: , Rfl:       Review of Systems  The Review of Systems has been reviewed by me  during today.  Review of Systems   Constitutional: Negative.    HENT: Negative.     Eyes: Negative.    Respiratory: Negative.     Cardiovascular: Negative.    Gastrointestinal: Negative.    Genitourinary: Negative.    Musculoskeletal: Negative.    Skin: Negative.    Neurological: Negative.    Psychiatric/Behavioral: Negative.         Physical Findings   Pulse 81   Temp 98.4 °F (36.9 °C) (Temporal)   Physical Exam  Vitals and nursing note reviewed.   Constitutional:       General: She is not in acute distress.     Appearance: She is well-developed. She is not diaphoretic.   HENT:      Head: Normocephalic and atraumatic.   Eyes:      General: No scleral icterus.     Conjunctiva/sclera: Conjunctivae normal.      Pupils: Pupils are equal, round, and reactive to light.   Neck:      Vascular: No JVD.      Trachea: Trachea normal.   Chest:      Chest wall: No mass.   Breasts:     Right: No mass, skin change or tenderness.      Left: No mass, skin change or tenderness.      Comments: Bilateral chest wall incisions healing well with Steri-Strips in place.  Serosanguineous fluid in bilateral CALLUM bulbs  Musculoskeletal:      Cervical back: Full passive range of motion without pain and neck supple.   Neurological:      Mental Status: She is alert and oriented to person, place, and time.   Psychiatric:         Speech: Speech normal.         Behavior: Behavior normal.           PATHOLOGY   I reviewed the pathology report.     Final Diagnosis:   A.  Left breast, mastectomy:  -Breast tissue with patchy fibrocystic change.  -Nipple and skin with no significant changes.     B.  Right breast, mastectomy:  -Invasive ductal carcinoma (1.2 cm), Grade 2 (Tubules: 3, Nuclear: 2, Mitoses: 1).      -The margins are negative for invasive carcinoma (more than 10 mm).  -Ductal carcinoma in situ, nuclear grade 2, cribriform type.      -The margins are negative for DCIS (more than 10 mm).  -Adjacent prior biopsy site.  -Fibrous scar with  calcifications.  -Uninvolved nipple and skin.   Electronically signed by Sophia Smith MD on 1/11/2024       Assessment   1. Recurrent breast cancer, right (HCC)          Plan   I reviewed the pathology report with the patient.  She has already met with Dr. June cuello to discuss Oncotype testing.  I remove the CALLUM drains today in the office.  I will see her back in 2 weeks to assess her healing.  She may slowly resume her normal activities.      Kusum Sandhu MD

## 2024-01-16 NOTE — PROGRESS NOTES
Virtual Telephone Check-In    Nidia Ricci verbally consents to a Virtual/Telephone Check-In visit on 01/16/24.  Patient has been referred to the [facility] website at [email or web address] to review the yearly Consent to Treat document.    Patient understands and accepts financial responsibility for any deductible, co-insurance and/or co-pays associated with this service.    Duration of the service: 60 minutes      [facility] Report of Consultation    Patient Name: Nidia Ricci   YOB: 1955   Medical Record Number: [id number]   CSN: [id number]   Consulting Physician: Mic Watkins MD  Referring Physician(s): No ref. provider found  Date of Consultation: 1/16/2024     Reason for Consultation:  [facility]idia Ricci [facility] in [facility] for the diagnosis of newly diagnosed right breast carcinoma.    History of Present Illness:     68 year old that presents with new diagnosis of right breast carcinoma.  H/o R breast carcinoma in 2000 in [place].  Underwent lumpectomy, ALND, RT.  Took tamoxifen for 3-1/2 years.  Underwent HOLLY/BSO and endocrine therapy was discontinued.  Screening mammogram 11/23-lobulated right breast mass.  Diagnostic mammogram 11/23-1.5 cm right breast mass.  Right 10:00 biopsy 11/23-grade 2 IDC, 95% ER/OR, HER2 negative, Ki-67 25%.  Genetic testing negative.  S/p bilateral mastectomy 1/8/2024.  Path R-1.2 cm grade 2 IDC, negative margins.  LN not interrogated.  95% ER/OR, HER2 1+ by IHC, Ki-67 12%.    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    Anxiety     Arrhythmia     Arthritis     Atrial fibrillation (HCC)     Back pain     Blurred vision     Breast cancer (HCC) 11/2000    right breast    Disorder of thyroid     Easy bruising     Exposure to medical diagnostic radiation     completed Feb 2001    Fatigue     Feeling lonely     Heart palpitations     History of  depression     History of rheumatic fever     History of stomach ulcers     Hypercholesterolemia     Hypertension     Mild tricuspid regurgitation     Night sweats     PONV (postoperative nausea and vomiting)     Rheumatic heart disease     Skin blushing/flushing     Sleep disturbance     Stress     Syncope     Thyroid disease     TIA (transient ischemic attack)     Visual impairment     glasses    Wears glasses        Past Surgical History:  Past Surgical History:   Procedure Laterality Date    HYSTERECTOMY      OTHER SURGICAL HISTORY Right     Lumpectomy    THYROIDECTOMY      Partial       Family Medical History:  Family History   Problem Relation Age of Onset    Heart Attack Father     Alcohol and Other Disorders Associated Father     Hypertension Father     Heart Attack Other        Gyne History:  OB History   No obstetric history on file.   Menarche age 13.  First delivery at age 31.  HOLLY/BSO at age 46.      Psychosocial History:  Social History     Socioeconomic History    Marital status:      Spouse name: Not on file    Number of children: 1    Years of education: Not on file    Highest education level: Not on file   Occupational History    Not on file   Tobacco Use    Smoking status: Never    Smokeless tobacco: Never   Vaping Use    Vaping Use: Never used   Substance and Sexual Activity    Alcohol use: Yes     Alcohol/week: 0.0 standard drinks of alcohol     Comment: Rare glass of wine/occassional    Drug use: No    Sexual activity: Not on file   Other Topics Concern     Service Not Asked    Blood Transfusions Not Asked    Caffeine Concern Yes     Comment: 1-2 large cups daily    Occupational Exposure Not Asked    Hobby Hazards Not Asked    Sleep Concern Not Asked    Stress Concern Not Asked    Weight Concern Not Asked    Special Diet Not Asked    Back Care Not Asked    Exercise Not Asked    Bike Helmet Not Asked    Seat Belt Not Asked    Self-Exams Not Asked   Social History Narrative    Not  on file     Social Determinants of Health     Financial Resource Strain: Not on file   Food Insecurity: Not on file   Transportation Needs: Not on file   Physical Activity: Not on file   Stress: Not on file   Social Connections: Not on file   Housing Stability: Not on file       Allergies:   No Known Allergies    Current Medications:    Current Outpatient Medications:     rivaroxaban (XARELTO) 20 MG Oral Tab, TAKE ONE TABLET BY MOUTH DAILY WITH FOOD, Disp: , Rfl:     HYDROcodone-acetaminophen (NORCO) 5-325 MG Oral Tab, Take 1 tablet by mouth every 6 (six) hours as needed for Pain., Disp: 10 tablet, Rfl: 0    cefadroxil 500 MG Oral Cap, Take 1 capsule (500 mg total) by mouth 2 (two) times daily for 10 days., Disp: 20 capsule, Rfl: 0    Cyanocobalamin (VITAMIN B 12) 500 MCG Oral Tab, Take 1,000 mcg by mouth daily., Disp: , Rfl:     dronedarone HCl (MULTAQ) 400 MG Oral Tab, Take 1 tablet (400 mg total) by mouth 2 (two) times daily., Disp: 180 tablet, Rfl: 3    ATORVASTATIN 10 MG Oral Tab, TAKE 1 TABLET BY MOUTH EVERY NIGHT AT BEDTIME, Disp: 90 tablet, Rfl: 3    escitalopram 10 MG Oral Tab, , Disp: , Rfl: 0    Coenzyme Q10 (CO Q-10) 100 MG Oral Cap, Take by mouth daily., Disp: , Rfl:     Levothyroxine Sodium (SYNTHROID) 50 MCG Oral Tab, Take 1 tablet (50 mcg total) by mouth before breakfast., Disp: , Rfl: 2    Ergocalciferol (VITAMIN D OR), Take 400 Units by mouth daily., Disp: , Rfl:     Review of Systems:    Constitutional: No chills, fevers, malaise, night sweats and weight loss. Fatigue  Eyes: No visual disturbance, irritation and redness.  Ears, nose, mouth, throat, and face: No hearing loss, tinnitus, hoarseness and voice change.  Respiratory: No cough, sputum, hemoptysis, chest pain, wheezing, dyspnea on exertion  Cardiovascular: No chest pain, palpitations, syncope,orthopnea, PND, edema  Gastrointestinal:No dysphagia, odynophagia, nausea, vomiting, diarrhea, abdominal pain.  Derm: No rash, skin lesions, and  pruritus.  Hematologic/lymphatic: No easy bruising, bleeding, and lymphadenopathy.  Musculoskeletal: No myalgias, arthralgias, muscle weakness.  Neurological: No headaches, dizziness, seizures, speech problems, gait problems   Psych: No anxiety/depression.    Vital Signs:      ECOG PS:     Physical Examination:         Labs:         Assessment and Plan:    # Stage I right breast carcinoma: 68-year-old postmenopausal female s/p bilateral mastectomy 1/24 for abnormal mammogram.  Path R-1.2 cm grade 2 IDC, strongly ER/NJ positive, HER2 negative, Ki-67 12%.  Genetic testing negative.  History of right breast carcinoma in 2020 in [place] for which she underwent right lumpectomy/ALND, RT in 2 to 3-1/2 years of tamoxifen.    I discussed the natural history, course, treatment and prognosis of postmenopausal breast carcinoma.  Recommend sending Oncotype.  Candidate for endocrine therapy with AI for 5 years.  Baseline DEXA ordered.  Side effects of AI discussed.  Will contact patient after Oncotype is back to make a decision on if there is benefit of adjuvant chemotherapy.          Procedures    XR DEXA BONE DENSITOMETRY ([id number])       Zulay Watkins M.D.    [facility]    [facility] [address] [place], [address]    1/16/2024
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
## 2024-01-23 ENCOUNTER — HOSPITAL ENCOUNTER (OUTPATIENT)
Dept: BONE DENSITY | Age: 69
Discharge: HOME | End: 2024-01-23
Attending: INTERNAL MEDICINE
Payer: MEDICARE

## 2024-01-23 DIAGNOSIS — Z78.0 POSTMENOPAUSAL: ICD-10-CM

## 2024-01-23 PROCEDURE — 77080 DXA BONE DENSITY AXIAL: CPT | Performed by: INTERNAL MEDICINE

## 2024-01-31 ENCOUNTER — OFFICE VISIT (OUTPATIENT)
Dept: SURGERY | Facility: CLINIC | Age: 69
End: 2024-01-31
Payer: MEDICARE

## 2024-01-31 VITALS — HEART RATE: 78 BPM | TEMPERATURE: 97 F

## 2024-01-31 DIAGNOSIS — C50.911 RECURRENT BREAST CANCER, RIGHT (HCC): Primary | ICD-10-CM

## 2024-01-31 DIAGNOSIS — Z90.13 S/P BILATERAL MASTECTOMY: ICD-10-CM

## 2024-01-31 PROCEDURE — 1159F MED LIST DOCD IN RCRD: CPT | Performed by: SURGERY

## 2024-01-31 PROCEDURE — 99024 POSTOP FOLLOW-UP VISIT: CPT | Performed by: SURGERY

## 2024-01-31 PROCEDURE — 1160F RVW MEDS BY RX/DR IN RCRD: CPT | Performed by: SURGERY

## 2024-01-31 NOTE — PROGRESS NOTES
Post Operative Visit Note       Active Problems  1. Recurrent breast cancer, right (HCC)    2. S/P bilateral mastectomy         Chief Complaint   Chief Complaint   Patient presents with    Post-Op     PO 1/8/24 BILATERAL BREAST MASTECTOMY- pt denies new concerns           History of Present Illness   The patient is a 68-year-old female who underwent bilateral simple mastectomy for recurrent right breast cancer. Her surgery was performed January 8, 2024. The patient presents for 2-week follow-up.  She is happy with the cosmetic result.  She has an appointment and a bra store next month.  She is without complaints.      Allergies  iNdia has No Known Allergies.    Past Medical / Surgical / Social / Family History    The past medical and past surgical history have been reviewed by me today.     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    Anxiety     Arrhythmia     Arthritis     Atrial fibrillation (HCC)     Back pain     Blurred vision     Breast cancer (HCC) 11/2000    right breast    Disorder of thyroid     Easy bruising     Exposure to medical diagnostic radiation     completed Feb 2001    Fatigue     Feeling lonely     Heart palpitations     History of depression     History of rheumatic fever     History of stomach ulcers     Hypercholesterolemia     Hypertension     Mild tricuspid regurgitation     Night sweats     PONV (postoperative nausea and vomiting)     Rheumatic heart disease     Skin blushing/flushing     Sleep disturbance     Stress     Syncope     Thyroid disease     TIA (transient ischemic attack)     Visual impairment     glasses    Wears glasses      Past Surgical History:   Procedure Laterality Date    HYSTERECTOMY      OTHER SURGICAL HISTORY Right     Lumpectomy    THYROIDECTOMY      Partial       The family history and social history have been reviewed by me today.    Family History   Problem Relation Age of  Onset    Heart Attack Father     Alcohol and Other Disorders Associated Father     Hypertension Father     Heart Attack Other      Social History     Socioeconomic History    Marital status:     Number of children: 1   Tobacco Use    Smoking status: Never    Smokeless tobacco: Never   Vaping Use    Vaping Use: Never used   Substance and Sexual Activity    Alcohol use: Yes     Alcohol/week: 0.0 standard drinks of alcohol     Comment: Rare glass of wine/occassional    Drug use: No   Other Topics Concern    Caffeine Concern Yes     Comment: 1-2 large cups daily        Current Outpatient Medications:     rivaroxaban (XARELTO) 20 MG Oral Tab, TAKE ONE TABLET BY MOUTH DAILY WITH FOOD, Disp: , Rfl:     HYDROcodone-acetaminophen (NORCO) 5-325 MG Oral Tab, Take 1 tablet by mouth every 6 (six) hours as needed for Pain., Disp: 10 tablet, Rfl: 0    Cyanocobalamin (VITAMIN B 12) 500 MCG Oral Tab, Take 1,000 mcg by mouth daily., Disp: , Rfl:     dronedarone HCl (MULTAQ) 400 MG Oral Tab, Take 1 tablet (400 mg total) by mouth 2 (two) times daily., Disp: 180 tablet, Rfl: 3    ATORVASTATIN 10 MG Oral Tab, TAKE 1 TABLET BY MOUTH EVERY NIGHT AT BEDTIME, Disp: 90 tablet, Rfl: 3    escitalopram 10 MG Oral Tab, , Disp: , Rfl: 0    Coenzyme Q10 (CO Q-10) 100 MG Oral Cap, Take by mouth daily., Disp: , Rfl:     Levothyroxine Sodium (SYNTHROID) 50 MCG Oral Tab, Take 1 tablet (50 mcg total) by mouth before breakfast., Disp: , Rfl: 2    Ergocalciferol (VITAMIN D OR), Take 400 Units by mouth daily., Disp: , Rfl:       Review of Systems  The Review of Systems has been reviewed by me during today.  Review of Systems   Constitutional:  Negative for chills, diaphoresis, fatigue, fever and unexpected weight change.   HENT:  Negative for hearing loss, nosebleeds, sore throat and trouble swallowing.    Respiratory:  Negative for apnea, cough, shortness of breath and wheezing.    Cardiovascular:  Negative for chest pain, palpitations and leg  swelling.   Gastrointestinal:  Negative for abdominal distention, abdominal pain, anal bleeding, blood in stool, constipation, diarrhea, nausea and vomiting.   Genitourinary:  Negative for difficulty urinating, dysuria, frequency and urgency.   Musculoskeletal:  Negative for arthralgias and myalgias.   Skin:  Negative for color change and rash.   Neurological:  Negative for tremors, syncope and weakness.   Hematological:  Negative for adenopathy. Does not bruise/bleed easily.   Psychiatric/Behavioral:  Negative for behavioral problems and sleep disturbance.        Physical Findings   Pulse 78   Temp 96.7 °F (35.9 °C) (Temporal)   Physical Exam  Vitals and nursing note reviewed.   Constitutional:       General: She is not in acute distress.     Appearance: She is well-developed. She is not diaphoretic.   HENT:      Head: Normocephalic and atraumatic.   Eyes:      General: No scleral icterus.     Conjunctiva/sclera: Conjunctivae normal.      Pupils: Pupils are equal, round, and reactive to light.   Neck:      Vascular: No JVD.      Trachea: Trachea normal.   Chest:      Chest wall: No mass.   Breasts:     Right: No mass, skin change or tenderness.      Left: No mass, skin change or tenderness.      Comments: Bilateral chest wall incisions well-healed  Musculoskeletal:      Cervical back: Full passive range of motion without pain and neck supple.   Neurological:      Mental Status: She is alert and oriented to person, place, and time.   Psychiatric:         Speech: Speech normal.         Behavior: Behavior normal.             Assessment   1. Recurrent breast cancer, right (HCC)    2. S/P bilateral mastectomy          Plan   I provided the patient prescriptions for prostheses and mastectomy bras.  I asked her to follow with Dr. Watkins regarding serial breast exams every 6 months or so.  She will follow-up with me as needed.      Imaging & Referrals   MASTECTOMY BRA  PROSTHETIC BREAST MASTECTOMY FORM      Kusum BOTELLO  MD Edson

## 2024-02-02 ENCOUNTER — NURSE NAVIGATOR ENCOUNTER (OUTPATIENT)
Dept: HEMATOLOGY/ONCOLOGY | Facility: HOSPITAL | Age: 69
End: 2024-02-02

## 2024-02-05 ENCOUNTER — NURSE NAVIGATOR ENCOUNTER (OUTPATIENT)
Dept: HEMATOLOGY/ONCOLOGY | Facility: HOSPITAL | Age: 69
End: 2024-02-05

## 2024-02-09 ENCOUNTER — TELEPHONE (OUTPATIENT)
Dept: HEMATOLOGY/ONCOLOGY | Facility: HOSPITAL | Age: 69
End: 2024-02-09

## 2024-02-09 NOTE — TELEPHONE ENCOUNTER
Called [employer] about patient's pending Oncotype DX test. Spoke to Joanne and she stated that they are currently awaiting for patient's [employer] insurance to complete their review of the our office notes and information we provided about the patient. She stated that [employer] will contact the patient's insurance again to check for status of completion of review.

## 2024-02-13 ENCOUNTER — TELEPHONE (OUTPATIENT)
Dept: HEMATOLOGY/ONCOLOGY | Facility: HOSPITAL | Age: 69
End: 2024-02-13

## 2024-02-13 NOTE — TELEPHONE ENCOUNTER
Called patient to confirm results of Oncotype test, score 4. Letrozole called in to pharmacy, patient will pick it up today. Scheduled follow up with Dr. Watkins for 4 weeks and also scheduled survivorship.  She is going to [facility] for a bra tomorrow, she will call back with any needs.

## 2024-03-04 NOTE — PROGRESS NOTES
[facility] Progress Note      Patient Name:  Nidia Ricci  YOB: 1955  Medical Record Number:  [id number]    Date of visit:  3/5/2024    CHIEF COMPLAINT: Right breast carcinoma    HPI:     68 year old that I initially met 1/24 after she underwent workup of abnormal mammogram.  History of right breast carcinoma in 2000 and [place].  S/p lumpectomy, ALND, RT.  Took tamoxifen for 3.5 years.  S/p HOLLY/BSO.  Genetic testing negative.    Diagnostic mammogram 11/23-1.5 cm right breast mass.  Biopsy-grade 2 IDC, 95% ER/[place], HER2 negative.  S/p bilateral mastectomy 1/8/24-1.2 cm grade 2 IDC.  LN not interrogated.  Oncotype was 4.  Letrozole started 2/24.  Presents for evaluation.    Mild hot flashes.     SOCIAL HISTORY:    . Daughter is pharmacist. 2 grandkids.       ROS:     Constitutional: no fever, chills fatigue,night sweats or weight loss  Neurologic: no headache, seizures, diplopia or weakness  Respiratory: no cough, SOB or hemoptysis  Cardiovascular: no chest pain, ankle swelling, FORD  GI: no nausea, vomiting, diarrhea or BRBPR  Musculoskeletal: no body-ache or joint pain  Dermatologic: no alopecia, rash, pruritis  : no hematuria, dysuria or frequency  Psych: no confusion or depression   Heme: no easy bruising or bleeding     ALLERGIES:  No Known Allergies    MEDICATIONS:    Current Outpatient Medications   Medication Sig Dispense Refill    Calcium Carbonate Antacid 1000 MG Oral Chew Tab Chew 1,000 mg by mouth daily.      letrozole 2.5 MG Oral Tab Take 1 tablet (2.5 mg total) by mouth daily. 30 tablet 6    rivaroxaban (XARELTO) 20 MG Oral Tab TAKE ONE TABLET BY MOUTH DAILY WITH FOOD      Cyanocobalamin (VITAMIN B 12) 500 MCG Oral Tab Take 1,000 mcg by mouth daily.      dronedarone HCl (MULTAQ) 400 MG Oral Tab Take 1 tablet (400 mg total) by mouth 2 (two) times daily. 180 tablet 3    ATORVASTATIN 10 MG Oral Tab TAKE 1 TABLET BY MOUTH EVERY NIGHT AT BEDTIME 90 tablet 3    escitalopram 10  MG Oral Tab   0    Coenzyme Q10 (CO Q-10) 100 MG Oral Cap Take by mouth daily.      Levothyroxine Sodium (SYNTHROID) 50 MCG Oral Tab Take 1 tablet (50 mcg total) by mouth before breakfast.  2    Ergocalciferol (VITAMIN D OR) Take 400 Units by mouth daily.         VITALS:  Height: --  Weight: 61.5 kg (135 lb 8 oz) (1134)  BSA (Calculated - sq m): --  Pulse: 74 (1134)  BP: 162/83 (1134)  Temp: 98.2 °F (36.8 °C) (1134)  Do Not Use - Resp Rate: --  SpO2: 97 % (1134)    PS:  ECO    PHYSICAL EXAM:    General: alert and oriented x 3, not in acute distress.  HEENT: SIDNEY, oropharynx  clear.  Neck: supple.  No JVD /adenopathy.  CVS: S1S2, regular  Rhythm, no murmurs.   Lungs: Clear to auscultation and percussion.  Abdomen: Soft, non tender, no hepato-splenomegaly.  Extremities:  No edema.  CNS: no focal deficit    Emotional well being: Patient's emotional well being was assessed.  No issues requiring acute psychosocial intervention were identified.     LABS:     No results found for this or any previous visit (from the past 24 hour(s)).    ASSESSMENT AND PLAN:     # Stage I right breast carcinoma: S/p bilateral mastectomy -1.2 cm grade 2 IDC, 95% ER/[place], HER2 negative, Ki-67 12%.  Genetic testing negative.     Oncotype score was 4 which corresponds to a 3% risk of distant recurrence with endocrine therapy.  Letrozole started .  Continue till .  Not candidate for RT or mammograms.    H/o right breast carcinoma in 2020 in [place], s/p R lumpectomy, ALND.  Received RT and took tamoxifen for 3-1/2 years.    # Osteopenia: DEXA  showed osteopenia with T-score -1.6.  Discussed adjuvant bisphosphonates per ASCO guidelines.  Her daughter is a pharmacist and she has increased her intake of dairy and vitamin D.  She wishes to see what her next DEXA looks like before deciding.  Repeat DEXA .    ORDERS PLACED:    Return in about 3 months (around 2024) for Labs, MD visit.    Zulay  MONIQUE Watkins.    [facility]    [address] [place], [address]    3/5/2024

## 2024-03-05 ENCOUNTER — OFFICE VISIT (OUTPATIENT)
Dept: HEMATOLOGY/ONCOLOGY | Facility: HOSPITAL | Age: 69
End: 2024-03-05
Attending: SURGERY
Payer: MEDICARE

## 2024-03-05 VITALS
SYSTOLIC BLOOD PRESSURE: 162 MMHG | TEMPERATURE: 98 F | HEART RATE: 74 BPM | WEIGHT: 135.5 LBS | RESPIRATION RATE: 16 BRPM | OXYGEN SATURATION: 97 % | DIASTOLIC BLOOD PRESSURE: 83 MMHG | BODY MASS INDEX: 24 KG/M2

## 2024-03-05 DIAGNOSIS — M89.9 BONE DISORDER: ICD-10-CM

## 2024-03-05 DIAGNOSIS — C50.811 MALIGNANT NEOPLASM OF OVERLAPPING SITES OF RIGHT BREAST IN FEMALE, ESTROGEN RECEPTOR POSITIVE (HCC): ICD-10-CM

## 2024-03-05 DIAGNOSIS — Z17.0 MALIGNANT NEOPLASM OF OVERLAPPING SITES OF RIGHT BREAST IN FEMALE, ESTROGEN RECEPTOR POSITIVE (HCC): ICD-10-CM

## 2024-03-05 DIAGNOSIS — Z90.13 H/O BILATERAL MASTECTOMY: Primary | ICD-10-CM

## 2024-03-05 PROCEDURE — 99214 OFFICE O/P EST MOD 30 MIN: CPT | Performed by: INTERNAL MEDICINE

## 2024-03-05 NOTE — PROGRESS NOTES
Education Record    Learner:  Patient    Disease / Diagnosis:right breast cancer       Barriers / Limitations:  None   Comments:    Method:  Discussion   Comments:    General Topics:  Plan of care reviewed   Comments:    Outcome:  Shows understanding   Comments:   Started letrozole in Feb.   Feeling well.   Reports having some mild hot flashes

## 2024-04-04 ENCOUNTER — OFFICE VISIT (OUTPATIENT)
Dept: HEMATOLOGY/ONCOLOGY | Facility: HOSPITAL | Age: 69
End: 2024-04-04
Attending: SURGERY
Payer: MEDICARE

## 2024-04-04 DIAGNOSIS — Z85.3 PERSONAL HISTORY OF BREAST CANCER: Primary | ICD-10-CM

## 2024-04-04 DIAGNOSIS — Z71.9 COUNSELING, UNSPECIFIED: ICD-10-CM

## 2024-04-04 DIAGNOSIS — Z08 ENCOUNTER FOR FOLLOW-UP EXAMINATION AFTER COMPLETED TREATMENT FOR MALIGNANT NEOPLASM: ICD-10-CM

## 2024-04-04 PROCEDURE — 99215 OFFICE O/P EST HI 40 MIN: CPT | Performed by: NURSE PRACTITIONER

## 2024-04-04 PROCEDURE — G2212 PROLONG OUTPT/OFFICE VIS: HCPCS | Performed by: NURSE PRACTITIONER

## 2024-04-04 RX ORDER — DOCUSATE SODIUM 100 MG/1
100 CAPSULE, LIQUID FILLED ORAL DAILY
COMMUNITY

## 2024-04-04 NOTE — PROGRESS NOTES
I met with Nidia for a Survivorship Clinic visit to provide a survivorship care plan (SCP) and information related to post-treatment care.  Nidia is a 68 year old female with a prior history of a right breast cancer in 2000 when she lived in [place]. She was treated there with a right lumpectomy with ALND, received radiation therapy, then took Tamoxifen for 2.5 years, followed by a different endocrine therapy for 1.5 years (total 4.5 years per patient report).  On 11/8/2023 she had a screening mammogram at [facility] that noted a lobulated right breast mass.  Diagnostic mammogram was done noting a 1.5 cm right breast mass. Biopsy on 11/20/2023 showed a Gr 2, IDC, ER+, TN+, HER2-, Ki-67 of 25%. Genetic testing done was negative for pathogenic variants. On 1/8/2024, Dr. Kusum Sandhu performed a bilateral mastectomy with no lymph node interrogation. Nidia declined reconstruction. Pathology showed a 1.2 cm, Gr 2 IDC, ER+, TN+, HER2- and Ki-67 of 12%, negative margins, Stage IA right breast cancer and the left breast had benign findings. She was seen by Dr. Zulay Watkins who performed an Oncotype DX with recurrence score of 4 and no need for adjuvant chemotherapy.  She was recommended to have endocrine therapy with Letrozole for 5 years.(See Oncology Treatment Summary SCP for details).      Current condition/issues: Nidia reports she is doing well post surgery. She has good ROM in both arms, reports that her right arm tires with exercise, but this has been true since original breast surgery in 2000. She reports that all of her lymph nodes were removed with the ALND in 2000. She has never had issues with lymphedema and uses right arm precautions.  She has purchased custom prosthetics but prefers not wearing due to the heaviness of the prosthetics. She reports feeling good post-mastectomy and enjoys the loss of weight from her previous heavy breasts. She has been taking Letrozole since 2/2024 and is tolerating well with only  mild hot flashes.  She had baseline bone density in 1/2024 that showed osteopenia.  Dr. Watkins discussed use of bisphosphonate therapy but Nidia would prefer to wait until next bone density result to make a decision. She has increased her dietary intake of Vitamin D and Calcium and takes supplemental Calcium (1000mg) and Vitamin D3 (2000IU)/ each day.  She has noted being more constipated since starting the Calcium and is now taking Docusate Sodium daily which helps. She takes daily two-mile brisk walks and is active around her house. She eats very healthy and current BMI is 24. She denies anxiety or depression and reports that this recurrence was easier for her to handle than her first diagnosis. She has good support from her  and daughter and her daily walks reduce her stress. She gets 8 hours of sleep each night.     Survivorship Care Plan and letter: She was provided a hard copy of the SCP and a letter that outlined the purpose of the visit and plan.  We reviewed all elements of both documents. She is aware that a letter and SCP will be sent to her PCP, Bautista Miller PA-C.     Reviewed Cancer Surveillance (office visits, bone density) and that Dr. Watkins will oversee this care.  She will see her next on 7/16/24 with bloodwork. She will see Dr. Ibrahima Zepeda on 4/30/24, who will now cover for Dr. Sandhu who has left the practice. She will be due for next bone density in 1/2026.     Reviewed Schedule of other clinic visits with Primary Care and specialists: Bautista Miller PA-C will continue to manage all general health care recommended for age and gender including cancer screening tests.  She is up-to-date with screening. She has several large moles that are unchanged, but could consider having a skin screen done. She was encouraged to continue to see specialists at usual intervals.     Reviewed concerning symptoms that she should report to any Provider.      Reviewed possible late and long-term effects  related to the treatment that was received.  She is familiar with care of the surgical arm and management of hormone related side effects.  We reviewed impact of body image and I will provide her with Knitted Knocker samples.     Reviewed common cancer survivor issues and resources available.  Various survivorship resources were provided to use going forward as needed (see below).     Reviewed Lifestyle/behaviors that can affect ongoing health, including the risk for the cancer coming back or developing another cancer.  We reviewed importance of physical activity including aerobic, weight bearing exercise and the need for strength training.  We reviewed a plant based diet.  We reviewed skin care and sun safety.     The patient received a take-home folder that included the following survivorship resources:   -SCP and patient letter  -Breast Survivorship Guide   -[facility] - nutrition and exercise classes, mind/body programs, education, support groups  -[facility]-education, support groups, special programs, nutrition and exercise classes, movement classes, mind/body programs, survivorship programs, individual counseling  -Prosthetic resources-Knitted Knockers (will provide sample)  -Lymphedema Prevention  -ChooseMy[employer].gov handout-nutrition, physical activity  -ACS Cancer Screening Guidelines  -Websites: American Cancer Society, [employer] for your Life     The patient was given the opportunity to ask questions.  She had a few questions and verbalized understanding of the information we discussed today.  Emotional support was provided. My total time spent caring for the patient on the day of the encounter: 90 minutes (10 minutes reviewing chart, 60 minutes of face to face counseling regarding survivorship education, review of care plan and additional resources and 20 minutes of documentation).  She was encouraged to call with any further questions.   PINA Mary

## 2024-04-30 ENCOUNTER — OFFICE VISIT (OUTPATIENT)
Facility: LOCATION | Age: 69
End: 2024-04-30
Payer: MEDICARE

## 2024-04-30 VITALS — HEART RATE: 74 BPM | TEMPERATURE: 97 F

## 2024-04-30 DIAGNOSIS — C50.411 MALIGNANT NEOPLASM OF UPPER-OUTER QUADRANT OF RIGHT BREAST IN FEMALE, ESTROGEN RECEPTOR POSITIVE (HCC): Primary | ICD-10-CM

## 2024-04-30 DIAGNOSIS — Z17.0 MALIGNANT NEOPLASM OF UPPER-OUTER QUADRANT OF RIGHT BREAST IN FEMALE, ESTROGEN RECEPTOR POSITIVE (HCC): Primary | ICD-10-CM

## 2024-04-30 PROCEDURE — 99214 OFFICE O/P EST MOD 30 MIN: CPT | Performed by: SURGERY

## 2024-04-30 NOTE — H&P
Nidia Ricci is a 68 year old female  Chief Complaint   Patient presents with    New Patient     NP- 1/8/24 BILATERAL BREAST MASTECTOMY W/Dr. Sandhu f/u        REFERRED BY    Patient presents in follow-up.  Patient underwent bilateral mastectomy by Dr. Sandhu in January 2024.  This was due to a recurrent right breast carcinoma.  Patient declined reconstruction at that time.  She follows regularly with Dr. Valdez and has been started on a aromatase inhibitor.  She is not on chemotherapy.  She denies any complaints.  She states that she does have a bra prosthesis she is not interested in wearing regularly.         .           Past Medical History:    Acute, but ill-defined, cerebrovascular disease    Angiomyolipoma of kidney    Anxiety    Arrhythmia    Arthritis    Atrial fibrillation (HCC)    Back pain    Blurred vision    Breast cancer (HCC)    right breast    Disorder of thyroid    Easy bruising    Exposure to medical diagnostic radiation    completed Feb 2001    Fatigue    Feeling lonely    Heart palpitations    History of depression    History of rheumatic fever    History of stomach ulcers    Hypercholesterolemia    Hypertension    Mild tricuspid regurgitation    Night sweats    PONV (postoperative nausea and vomiting)    Rheumatic heart disease    Skin blushing/flushing    Sleep disturbance    Stress    Syncope    Thyroid disease    TIA (transient ischemic attack)    Visual impairment    glasses    Wears glasses     Past Surgical History:   Procedure Laterality Date    Hysterectomy      Other surgical history Right     Lumpectomy    Thyroidectomy      Partial     Current Outpatient Medications on File Prior to Visit   Medication Sig Dispense Refill    docusate sodium 100 MG Oral Cap Take 1 capsule (100 mg total) by mouth daily.      Calcium Carbonate Antacid 1000 MG Oral Chew Tab Chew 1,000 mg by mouth daily.      letrozole 2.5 MG Oral Tab Take 1 tablet (2.5 mg total) by mouth daily. 30 tablet 6     rivaroxaban (XARELTO) 20 MG Oral Tab TAKE ONE TABLET BY MOUTH DAILY WITH FOOD      Cyanocobalamin (VITAMIN B 12) 500 MCG Oral Tab Take 1,000 mcg by mouth daily.      dronedarone HCl (MULTAQ) 400 MG Oral Tab Take 1 tablet (400 mg total) by mouth 2 (two) times daily. 180 tablet 3    ATORVASTATIN 10 MG Oral Tab TAKE 1 TABLET BY MOUTH EVERY NIGHT AT BEDTIME 90 tablet 3    escitalopram 10 MG Oral Tab   0    Coenzyme Q10 (CO Q-10) 100 MG Oral Cap Take by mouth daily.      Levothyroxine Sodium (SYNTHROID) 50 MCG Oral Tab Take 1 tablet (50 mcg total) by mouth before breakfast.  2    Ergocalciferol (VITAMIN D OR) Take 400 Units by mouth daily.       No current facility-administered medications on file prior to visit.     @ALL@  Family History   Problem Relation Age of Onset    Heart Attack Father     Alcohol and Other Disorders Associated Father     Hypertension Father     Heart Attack Other      Social History     Socioeconomic History    Marital status:     Number of children: 1   Tobacco Use    Smoking status: Never    Smokeless tobacco: Never   Vaping Use    Vaping status: Never Used   Substance and Sexual Activity    Alcohol use: Yes     Alcohol/week: 0.0 standard drinks of alcohol     Comment: Rare glass of wine/occassional    Drug use: No   Other Topics Concern    Caffeine Concern Yes     Comment: 1-2 large cups daily     Social Determinants of Health      [facility]    Social Connections    [facility]    Housing Stability       ROS     GENERAL HEALTH: otherwise feels well, no weight loss, no fever or chills  SKIN: denies any unusual skin rashes or jaundice  HEENT: denies nasal congestion, sinus pain or sore throat; hearing loss negative,   EYES , no diplopia or vision changes  RESPIRATORY: denies shortness of breath, wheezing or cough   CARDIOVASCULAR: denies chest pain or FORD; no  palpitations   GI: denies nausea, vomiting, constipation, diarrhea; no rectal bleeding; no heartburn  GENITAL/: no dysuria, urgency or frequency, no tea colored urine  MUSCULOSKELETAL: no joint complaints upper or lower extremities  HEMATOLOGY: denies hx anemia; denies bruising or excessive bleeding  Endocrine: no weight gain or loss no hot or cold intolerance    EXAM     Pulse 74, temperature 96.5 °F (35.8 °C), temperature source Temporal.  GENERAL: well developed, well nourished female, in no apparent distress.    MENTAL STATUS :Alert, oriented x 3  PSYCH: normal mood and affect  SKIN: anicteric, no rashes, no bruising  EYES: PERRLA, EOMI, sclera anicteric,  conjunctiva without pallor  HEENT: normocephalic, atraumatic, TMs clear, nares patent, mouth moist, pharynx w/o erythema  NECK: supple, no JVD, no adenopathy, no thyromegaly  RESPIRATORY: clear to auscultation, no rales , rhonchi or wheezing  CARDIOVASCULAR: RRR, murmur negative  ABDOMEN: normal active BS, soft, nondistended  no HSM, no masses or hernias  LYMPHATIC: no axillary , supraclavicular or inguinal lymphadenopathy  EXTREMITIES: no cyanosis, clubbing or edema, no atrophy, full ROM  Chest wall examined demonstrates no evidence of recurrent masses on the right or left incisions are healing well no axillary lymphadenopathy  STUDIES:     Admission on 01/08/2024, Discharged on 01/08/2024   Component Date Value Ref Range Status    Case Report 01/08/2024    Final                    Value:Surgical Pathology                                Case: [id number]                                   Authorizing Provider:  Kusum Sandhu MD    Collected:           01/08/2024 10:40 AM          Ordering Location:     [facility]    Received:            01/08/2024 12:16 PM          Pathologist:           Sophia Smith MD                                                             Specimens:   A) - Breast, left, left breast -left stich marks axilary tale                                        B) - Breast, right, right breast -right stich marks axilary tale                           Addendum 1 01/08/2024    Final                    Value:This result contains rich text formatting which cannot be displayed here.    Final Diagnosis: 01/08/2024    Final                    Value:This result contains rich text formatting which cannot be displayed here.    Synoptic Report 01/08/2024    Final                    Value:INVASIVE CARCINOMA OF THE BREAST: Resection                            INVASIVE CARCINOMA OF THE BREAST: RESECTION - B                            8th Edition - Protocol posted: 6/21/2023                                                        SPECIMEN                               Procedure:    Total mastectomy                                Specimen Laterality:    Right                                                         TUMOR                               Histologic Type:    Invasive carcinoma of no special type (ductal)                                Histologic Grade (Gackle Histologic Score):                                     Glandular (Acinar) / Tubular Differentiation:    Score 3                                  Nuclear Pleomorphism:    Score 2                                  Mitotic Rate:    Score 1                                  Overall Grade:    Grade 2 (scores of 6 or 7)                                Tumor Size:    Greatest dimension of largest invasive focus (Millimeters): 12 mm                               Ductal Carcinoma In Situ (DCIS):    Present                                  Architectural Patterns:    Cribriform                                  Nuclear Grade:    Grade II (intermediate)                                Lymphatic and / or Vascular Invasion:    Not identified                                Treatment Effect in the Breast:    No known presurgical therapy                                                         MARGINS                                Margin Status for Invasive Carcinoma:    All margins negative for invasive carcinoma                                  Distance from Invasive Carcinoma to Closest Margin:    Greater than: 10 mm                               Margin Status for DCIS:    All margins negative for DCIS                                  Distance from DCIS to Closest Margin:    Greater than: 10 mm                                                        REGIONAL LYMPH NODES                               Regional Lymph Node Status:    Not applicable (no regional lymph nodes submitted or found)                                                         pTNM CLASSIFICATION (AJCC 8th Edition)                               Reporting of pT, pN, and (when applicable) pM categories is based on information available to the pathologist at the time the report is issued. As per the AJCC (Chapter 1, 8th Ed.) it is the managing physician’s responsibility to establish the final pathologic stage based upon all pertinent information, including but potentially not limited to this pathology report.                               pT Category:    pT1c                                pN Category:    pN not assigned (no nodes submitted or found)                                                         SPECIAL STUDIES                               Estrogen Receptor (ER) Status:    Positive (greater than 10% of cells demonstrate nuclear positivity)                                  Percentage of Cells with Nuclear Positivity:    95 %                               Progesterone Receptor (PgR) Status:    Positive                                  Percentage of Cells with Nuclear Positivity:    95 %                               HER2 (by immunohistochemistry):    Negative (Score 1+)                                Ki-67 Percentage of Positive Nuclei:    12 %                               Testing Performed on Case Number:    [id number]       Clinical Information  01/08/2024    Final                    Value:This result contains rich text formatting which cannot be displayed here.    Gross Description 01/08/2024    Final                    Value:This result contains rich text formatting which cannot be displayed here.    Interpretation 01/08/2024 Malignant (A)    Final    Hepatitis B Surface Antigen 01/08/2024 Nonreactive  Nonreactive  Final    Hbsag Screen Index 01/08/2024 <0.10   Final    Hepatitis C Virus 01/08/2024 Nonreactive  Nonreactive  Final    Rapid HIV Ag/Ab Combo 01/08/2024 Nonreactive  Nonreactive Final    A NONREACTIVE test result means that HIV-1 or HIV-2 antibodies and HIV-1 p24 antigen were not detected in the specimen. A result of NONREACTIVE for an HIV-1/2 Ag/Ab rapid screening test does not necessarily exclude the possibility of exposure to or infection with HIV-1/2. HIV antibodies and/or p24 antigen may be undetectable in some stages of the infection and in some clinical conditions.    Detection of p24 may be inhibited by biotin in the sample, causing false negative results in acute infection. Caution is advised interpreting these results in patients on biotin supplementation.         ASSESSMENT   Imp: Status post bilateral mastectomy for recurrent right breast carcinoma  Plan: Patient is to continue follow-up with Dr. Valdez repeat examination with surgery in 1 years time.  PLan:      Meds & Refills for this Visit:  Requested Prescriptions      No prescriptions requested or ordered in this encounter       Imaging & Consults:  None

## 2024-07-15 NOTE — PROGRESS NOTES
[facility] Progress Note      Patient Name:  Nidia Ricci  YOB: 1955  Medical Record Number:  [id number]    Date of visit:  7/16/2024      CHIEF COMPLAINT: R breast carcinoma    HPI:     69 year old that I initially met 1/24 after she underwent workup of abnormal mammogram.  History of right breast carcinoma in 2000 and [place].  S/p lumpectomy, ALND, RT.  Took tamoxifen for 3.5 years.  S/p HOLLY/BSO.  Genetic testing negative.    Diagnostic mammogram 11/23-1.5 cm right breast mass.  Biopsy-grade 2 IDC, 95% ER/MN, HER2 negative.  S/p bilateral mastectomy 1/8/24-1.2 cm grade 2 IDC.  LN not interrogated.  Oncotype was 4.  Letrozole started 2/24.  Presents for evaluation.    More hot flashes and anxiety. Active.     SOCIAL HISTORY:    . Daughter is pharmacist. 2 grandkids.       ROS:     Constitutional: no fever, chills fatigue,night sweats or weight loss  Neurologic: no headache, seizures, diplopia or weakness  Respiratory: no cough, SOB or hemoptysis  Cardiovascular: no chest pain, ankle swelling, FORD  GI: no nausea, vomiting, diarrhea or BRBPR  Musculoskeletal: no body-ache or joint pain  Dermatologic: no alopecia, rash, pruritis  : no hematuria, dysuria or frequency  Psych: no confusion or depression   Heme: no easy bruising or bleeding     ALLERGIES:  No Known Allergies    MEDICATIONS:    Current Outpatient Medications   Medication Sig Dispense Refill    docusate sodium 100 MG Oral Cap Take 1 capsule (100 mg total) by mouth daily.      Calcium Carbonate Antacid 1000 MG Oral Chew Tab Chew 1,000 mg by mouth daily.      letrozole 2.5 MG Oral Tab Take 1 tablet (2.5 mg total) by mouth daily. 30 tablet 6    rivaroxaban (XARELTO) 20 MG Oral Tab TAKE ONE TABLET BY MOUTH DAILY WITH FOOD      Cyanocobalamin (VITAMIN B 12) 500 MCG Oral Tab Take 1,000 mcg by mouth daily.      dronedarone HCl (MULTAQ) 400 MG Oral Tab Take 1 tablet (400 mg total) by mouth 2 (two) times daily. 180 tablet 3     ATORVASTATIN 10 MG Oral Tab TAKE 1 TABLET BY MOUTH EVERY NIGHT AT BEDTIME 90 tablet 3    escitalopram 10 MG Oral Tab   0    Coenzyme Q10 (CO Q-10) 100 MG Oral Cap Take by mouth daily.      Levothyroxine Sodium (SYNTHROID) 50 MCG Oral Tab Take 1 tablet (50 mcg total) by mouth before breakfast.  2    Ergocalciferol (VITAMIN D OR) Take 400 Units by mouth daily.         VITALS:  Height: --  Weight: 60.6 kg (133 lb 8 oz) (1022)  BSA (Calculated - sq m): --  Pulse: 84 (1022)  BP: 131/86 (1022)  Temp: 97.4 °F (36.3 °C) (1022)  Do Not Use - Resp Rate: --  SpO2: 98 % (1022)    PS:  ECO    PHYSICAL EXAM:    General: alert and oriented x 3, not in acute distress.  HEENT: SIDNEY, oropharynx  clear.  Neck: supple.  No JVD /adenopathy.  CVS: S1S2, regular  Rhythm, no murmurs.   Lungs: Clear to auscultation and percussion.  Abdomen: Soft, non tender, no hepato-splenomegaly.  Extremities:  No edema.  CNS: no focal deficit    Emotional well being: Patient's emotional well being was assessed.  No issues requiring acute psychosocial intervention were identified.     LABS:     No results found for this or any previous visit (from the past 24 hour(s)).    ASSESSMENT AND PLAN:     # Stage I R breast ca: S/p jerad mastectomy -1.2 cm grade 2 IDC, 95% ER/SC, HER2 negative, Ki-67 12%.  Genetic testing negative.     Oncotype score was 4 which corresponds to a 3% risk of distant recurrence with endocrine therapy.  Letrozole started .  Continue till .  Not candidate for RT or mammograms.    H/o right breast carcinoma in 2020 in [place], s/p R lumpectomy, ALND.  Received RT and took tamoxifen for 3-1/2 years.    # Osteopenia: DEXA  showed osteopenia with T-score -1.6.  Discussed adjuvant bisphosphonates per ASCO guidelines.  Her daughter is a pharmacist and she has increased her intake of dairy and vitamin D.  She wishes to see what her next DEXA looks like before deciding.  Repeat DEXA  1/26.    ORDERS PLACED:    Return for MD visit 4-6 months.    Zulay Watkins M.D.    [facility]    [facility]  [address] [place], [address]    7/16/2024

## 2024-07-16 ENCOUNTER — OFFICE VISIT (OUTPATIENT)
Dept: HEMATOLOGY/ONCOLOGY | Facility: HOSPITAL | Age: 69
End: 2024-07-16
Attending: SURGERY
Payer: MEDICARE

## 2024-07-16 VITALS
SYSTOLIC BLOOD PRESSURE: 131 MMHG | WEIGHT: 133.5 LBS | BODY MASS INDEX: 24 KG/M2 | TEMPERATURE: 97 F | HEART RATE: 84 BPM | DIASTOLIC BLOOD PRESSURE: 86 MMHG | OXYGEN SATURATION: 98 %

## 2024-07-16 DIAGNOSIS — M89.9 BONE DISORDER: ICD-10-CM

## 2024-07-16 DIAGNOSIS — Z17.0 MALIGNANT NEOPLASM OF OVERLAPPING SITES OF RIGHT BREAST IN FEMALE, ESTROGEN RECEPTOR POSITIVE (HCC): Primary | ICD-10-CM

## 2024-07-16 DIAGNOSIS — Z78.0 POSTMENOPAUSAL: ICD-10-CM

## 2024-07-16 DIAGNOSIS — C50.811 MALIGNANT NEOPLASM OF OVERLAPPING SITES OF RIGHT BREAST IN FEMALE, ESTROGEN RECEPTOR POSITIVE (HCC): Primary | ICD-10-CM

## 2024-07-16 PROCEDURE — 99214 OFFICE O/P EST MOD 30 MIN: CPT | Performed by: INTERNAL MEDICINE

## 2024-07-16 NOTE — PROGRESS NOTES
Education Record    Learner:  Patient    Disease / Diagnosis: right breast cancer   Talib mastectomy     Barriers / Limitations:  None   Comments:    Method:  Discussion   Comments:    General Topics:  Plan of care reviewed   Comments:    Outcome:  Shows understanding   Comments:   Taking letrozole.   Reports increased hot flashes, and poss. Increased anxiety.   Staying active and takes Calcium. Denies joint pain

## 2024-08-05 ENCOUNTER — TELEPHONE (OUTPATIENT)
Dept: HEMATOLOGY/ONCOLOGY | Facility: HOSPITAL | Age: 69
End: 2024-08-05

## 2024-08-21 RX ORDER — LETROZOLE 2.5 MG/1
2.5 TABLET, FILM COATED ORAL DAILY
Qty: 30 TABLET | Refills: 6 | Status: SHIPPED | OUTPATIENT
Start: 2024-08-21

## 2024-08-22 ENCOUNTER — TELEPHONE (OUTPATIENT)
Dept: HEMATOLOGY/ONCOLOGY | Facility: HOSPITAL | Age: 69
End: 2024-08-22

## 2024-08-22 NOTE — TELEPHONE ENCOUNTER
Returned call to patient, asking about a letrozole refill.  Noted that on 8/21 a 6 month refill was called into her pharmacy. She has her follow up scheduled with Dr. Watkins for December.

## 2024-12-04 ENCOUNTER — OFFICE VISIT (OUTPATIENT)
Facility: LOCATION | Age: 69
End: 2024-12-04
Payer: MEDICARE

## 2024-12-04 VITALS
SYSTOLIC BLOOD PRESSURE: 138 MMHG | HEART RATE: 58 BPM | OXYGEN SATURATION: 97 % | DIASTOLIC BLOOD PRESSURE: 76 MMHG | TEMPERATURE: 98 F | RESPIRATION RATE: 18 BRPM

## 2024-12-04 DIAGNOSIS — Z85.3 HISTORY OF RIGHT BREAST CANCER: Primary | ICD-10-CM

## 2024-12-04 PROCEDURE — 3078F DIAST BP <80 MM HG: CPT | Performed by: SURGERY

## 2024-12-04 PROCEDURE — 3075F SYST BP GE 130 - 139MM HG: CPT | Performed by: SURGERY

## 2024-12-04 PROCEDURE — 99214 OFFICE O/P EST MOD 30 MIN: CPT | Performed by: SURGERY

## 2024-12-04 PROCEDURE — 1170F FXNL STATUS ASSESSED: CPT | Performed by: SURGERY

## 2024-12-04 PROCEDURE — 1159F MED LIST DOCD IN RCRD: CPT | Performed by: SURGERY

## 2024-12-04 PROCEDURE — 1160F RVW MEDS BY RX/DR IN RCRD: CPT | Performed by: SURGERY

## 2024-12-15 NOTE — PROGRESS NOTES
[facility] Progress Note      Patient Name:  Nidia Ricci  YOB: 1955  Medical Record Number:  [id number]    Date of visit:  12/17/2024    CHIEF COMPLAINT: R breast carcinoma    HPI:     69 year old that I have followed since 1/24 after she underwent workup of abnormal mammogram.  H/o R breast carcinoma in 2000 in [place].  S/p lumpectomy, ALND, RT.  Took Tamoxifen for 3.5 years.  S/p HOLLY/BSO.  Genetic testing negative.    Diagnostic mammogram 11/23-1.5 cm right breast mass.  Biopsy-grade 2 IDC, 95% ER/TX, HER2 negative.  S/p bilateral mastectomy 1/8/24-1.2 cm grade 2 IDC.  LN not interrogated.  Oncotype was 4.  Letrozole started 2/24.  Presents for evaluation.    More hot flashes and anxiety. Gained weight after bunion surgery.     SOCIAL HISTORY:    . Daughter is pharmacist. 2 grandkids.       ROS:     Constitutional: no fever, chills fatigue,night sweats or weight loss  Neurologic: no headache, seizures, diplopia or weakness  Respiratory: no cough, SOB or hemoptysis  Cardiovascular: no chest pain, ankle swelling, FORD  GI: no nausea, vomiting, diarrhea or BRBPR  Musculoskeletal: no body-ache or joint pain  Dermatologic: no alopecia, rash, pruritis  : no hematuria, dysuria or frequency  Psych: no confusion or depression   Heme: no easy bruising or bleeding     ALLERGIES:  No Known Allergies    MEDICATIONS:    Current Outpatient Medications   Medication Sig Dispense Refill    LETROZOLE 2.5 MG Oral Tab TAKE 1 TABLET(2.5 MG) BY MOUTH DAILY 30 tablet 6    docusate sodium 100 MG Oral Cap Take 1 capsule (100 mg total) by mouth daily.      Calcium Carbonate Antacid 1000 MG Oral Chew Tab Chew 1,000 mg by mouth daily.      rivaroxaban (XARELTO) 20 MG Oral Tab TAKE ONE TABLET BY MOUTH DAILY WITH FOOD      Cyanocobalamin (VITAMIN B 12) 500 MCG Oral Tab Take 1,000 mcg by mouth daily.      dronedarone HCl (MULTAQ) 400 MG Oral Tab Take 1 tablet (400 mg total) by mouth 2 (two) times daily. 180 tablet 3     ATORVASTATIN 10 MG Oral Tab TAKE 1 TABLET BY MOUTH EVERY NIGHT AT BEDTIME 90 tablet 3    escitalopram 10 MG Oral Tab   0    Coenzyme Q10 (CO Q-10) 100 MG Oral Cap Take by mouth daily.      Levothyroxine Sodium (SYNTHROID) 50 MCG Oral Tab Take 1 tablet (50 mcg total) by mouth before breakfast.  2    Ergocalciferol (VITAMIN D OR) Take 400 Units by mouth daily.         VITALS:  Height: --  Weight: 60.1 kg (132 lb 6.4 oz) (1133)  BSA (Calculated - sq m): --  Pulse: 65 (1133)  BP: 134/79 (1133)  Temp: 98.6 °F (37 °C) (1133)  Do Not Use - Resp Rate: --  SpO2: 96 % (1133)    PS:  ECO    PHYSICAL EXAM:    General: alert and oriented x 3, not in acute distress.  HEENT: SIDNEY, oropharynx  clear.  Neck: supple.  No JVD /adenopathy.  CVS: S1S2, regular  Rhythm, no murmurs.   Lungs: Clear to auscultation and percussion.  Abdomen: Soft, non tender, no hepato-splenomegaly.  Extremities:  No edema.  CNS: no focal deficit  Chest wall: Well-healed mastectomy scar.  No masses.    Emotional well being: Patient's emotional well being was assessed.  No issues requiring acute psychosocial intervention were identified.     LABS:     No results found for this or any previous visit (from the past 24 hours).    ASSESSMENT AND PLAN:     # Stage I R breast ca: S/p jerad mastectomy -1.2 cm grade 2 IDC, 95% ER/OK, HER2 negative, Ki-67 12%.  Genetic testing negative.     Oncotype score was 4 which corresponds to a 3% risk of distant recurrence with endocrine therapy.  Letrozole started .  Continue till .  Not candidate for RT or mammograms.    H/o R  breast carcinoma in 2020 in [place], s/p R lumpectomy, ALND.  Received RT and took tamoxifen for 3-1/2 years.    # Hot flashes: Manageable, feels escitalopram is helping.    # Osteopenia: DEXA  showed osteopenia with T-score -1.6.  Discussed adjuvant bisphosphonates per ASCO guidelines.  Her daughter is a pharmacist and she has increased her intake of  dairy and vitamin D.  She wishes to see what her next DEXA looks like before deciding.  Repeat DEXA 1/26.    Survivorship issues were addressed with the patient.  Patient-reported issues included hot flashes .  Recommended interventions as follows:  on escitalopram..     ORDERS PLACED:    Return in about 6 months (around 6/17/2025) for MD visit.    Zulay Watkins M.D.    [facility]    [facility]  [address] [place] [address] [place], [address]    12/17/2024

## 2024-12-17 ENCOUNTER — OFFICE VISIT (OUTPATIENT)
Age: 69
End: 2024-12-17
Attending: SURGERY
Payer: MEDICARE

## 2024-12-17 VITALS
DIASTOLIC BLOOD PRESSURE: 79 MMHG | SYSTOLIC BLOOD PRESSURE: 134 MMHG | RESPIRATION RATE: 18 BRPM | HEART RATE: 65 BPM | TEMPERATURE: 99 F | OXYGEN SATURATION: 96 % | WEIGHT: 132.38 LBS | BODY MASS INDEX: 23 KG/M2

## 2024-12-17 DIAGNOSIS — Z90.13 H/O BILATERAL MASTECTOMY: ICD-10-CM

## 2024-12-17 DIAGNOSIS — T50.905D MEDICATION ADVERSE EFFECT, SUBSEQUENT ENCOUNTER: ICD-10-CM

## 2024-12-17 DIAGNOSIS — M89.9 BONE DISORDER: ICD-10-CM

## 2024-12-17 DIAGNOSIS — C50.811 MALIGNANT NEOPLASM OF OVERLAPPING SITES OF RIGHT BREAST IN FEMALE, ESTROGEN RECEPTOR POSITIVE (HCC): Primary | ICD-10-CM

## 2024-12-17 DIAGNOSIS — Z17.0 MALIGNANT NEOPLASM OF OVERLAPPING SITES OF RIGHT BREAST IN FEMALE, ESTROGEN RECEPTOR POSITIVE (HCC): Primary | ICD-10-CM

## 2024-12-17 LAB
ALBUMIN SERPL-MCNC: 4.4 G/DL (ref 3.2–4.8)
ALBUMIN/GLOB SERPL: 1.3 {RATIO} (ref 1–2)
ALP LIVER SERPL-CCNC: 63 U/L
ALT SERPL-CCNC: 15 U/L
ANION GAP SERPL CALC-SCNC: 8 MMOL/L (ref 0–18)
AST SERPL-CCNC: 24 U/L (ref ?–34)
BASOPHILS # BLD AUTO: 0.01 X10(3) UL (ref 0–0.2)
BASOPHILS NFR BLD AUTO: 0.2 %
BILIRUB SERPL-MCNC: 1.1 MG/DL (ref 0.2–1.1)
BUN BLD-MCNC: 11 MG/DL (ref 9–23)
CALCIUM BLD-MCNC: 9.7 MG/DL (ref 8.7–10.4)
CHLORIDE SERPL-SCNC: 105 MMOL/L (ref 98–112)
CO2 SERPL-SCNC: 24 MMOL/L (ref 21–32)
CREAT BLD-MCNC: 0.92 MG/DL
EGFRCR SERPLBLD CKD-EPI 2021: 67 ML/MIN/1.73M2 (ref 60–?)
EOSINOPHIL # BLD AUTO: 0.06 X10(3) UL (ref 0–0.7)
EOSINOPHIL NFR BLD AUTO: 0.9 %
ERYTHROCYTE [DISTWIDTH] IN BLOOD BY AUTOMATED COUNT: 13.1 %
GLOBULIN PLAS-MCNC: 3.3 G/DL (ref 2–3.5)
GLUCOSE BLD-MCNC: 100 MG/DL (ref 70–99)
HCT VFR BLD AUTO: 42.7 %
HGB BLD-MCNC: 14.2 G/DL
IMM GRANULOCYTES # BLD AUTO: 0.02 X10(3) UL (ref 0–1)
IMM GRANULOCYTES NFR BLD: 0.3 %
LYMPHOCYTES # BLD AUTO: 0.77 X10(3) UL (ref 1–4)
LYMPHOCYTES NFR BLD AUTO: 11.6 %
MCH RBC QN AUTO: 30.8 PG (ref 26–34)
MCHC RBC AUTO-ENTMCNC: 33.3 G/DL (ref 31–37)
MCV RBC AUTO: 92.6 FL
MONOCYTES # BLD AUTO: 0.24 X10(3) UL (ref 0.1–1)
MONOCYTES NFR BLD AUTO: 3.6 %
NEUTROPHILS # BLD AUTO: 5.54 X10 (3) UL (ref 1.5–7.7)
NEUTROPHILS # BLD AUTO: 5.54 X10(3) UL (ref 1.5–7.7)
NEUTROPHILS NFR BLD AUTO: 83.4 %
OSMOLALITY SERPL CALC.SUM OF ELEC: 283 MOSM/KG (ref 275–295)
PLATELET # BLD AUTO: 304 10(3)UL (ref 150–450)
POTASSIUM SERPL-SCNC: 4.3 MMOL/L (ref 3.5–5.1)
PROT SERPL-MCNC: 7.7 G/DL (ref 5.7–8.2)
RBC # BLD AUTO: 4.61 X10(6)UL
SODIUM SERPL-SCNC: 137 MMOL/L (ref 136–145)
VIT D+METAB SERPL-MCNC: 46.1 NG/ML (ref 30–100)
WBC # BLD AUTO: 6.6 X10(3) UL (ref 4–11)

## 2024-12-17 NOTE — PROGRESS NOTES
Education Record    Learner:  Patient    Disease / Diagnosis:right breast cancer   6 month FU   Talib mastectomy  Barriers / Limitations:  None   Comments:    Method:  Discussion   Comments:    General Topics:  Plan of care reviewed   Comments:    Outcome:  Shows understanding   Comments:   Taking letrozole.   Dexa scan up to date.     Reports hot flashes are worse in the hot months, but better now.   Did have bunionectomy in October, had limited mobility and less walking for about a month.

## 2024-12-17 NOTE — PROGRESS NOTES
[facility]  Progress Note    Nidia Ricci Patient Status:  No patient class for patient encounter    1955 MRN [id number]   Location [facility], [facility] Attending No att. providers found   Hosp Day # 0 PCP [facility]     Subjective: Patient is status post bilateral mastectomy.  Patient developed recurrent right breast carcinoma.  She is currently under the care of Dr. Valdez she is currently taking an aromatase inhibitor.  She denies complaints.      Objective/Physical Exam:    [unfilled]    General: Alert, orientated x3.  Cooperative.  No apparent distress.  Vital Signs:  Blood pressure 138/76, pulse 58, temperature 97.8 °F (36.6 °C), temperature source Temporal, resp. rate 18, SpO2 97%.  HEENT: Exam is unremarkable.  Without scleral icterus.  Mucous membranes are moist. Oropharynx is clear.  Neck: No tenderness to palpitation.  No JVD. Supple.   Lungs: Clear to auscultation bilaterally.  Cardiac: Regular rate and rhythm. No murmur.  Abdomen:  Soft, non-distended, non-tender, with no rebound or guarding.  No peritoneal signs. No ascites.  Liver is within normal limits.  Spleen is not palpable   Extremities:  No lower extremity edema noted.  Without clubbing or cyanosis.    Chest wall examination performed with Sheng BUSH demonstrates no evidence of masses.  No axillary lymphadenopathy no supraclavicular lymphadenopathy.    Labs:             Assessment/Plan:  Patient Active Problem List   Diagnosis    Mild tricuspid regurgitation    Atrial fibrillation (HCC)    Hypertension, benign    Hypercholesterolemia    Syncope    TIA (transient ischemic attack)    Atrial fibrillation with RVR (HCC)    Angiomyolipoma of kidney    Adenomatous polyps    Current use of long term anticoagulation    Personal history of colonic polyps    Recurrent breast cancer, right (HCC)    S/P bilateral mastectomy       Impression: Status post bilateral mastectomy for recurrent breast  carcinoma no evidence of recurrence.  Patient continues to follow with Dr. Valdez.  Will refer patient to breast clinic with Dr. Spivey for future ongoing management.  In my absence  I spent  33  minutes face to face with the patient. More than 50% of that time was spent counseling the patient and/or on coordination of care. The diagnosis, prognosis, and general treatment was explained to the patient and the family.        Ibrahima Zepeda,   [facility]  12/17/2024  8:23 AM

## 2024-12-18 ENCOUNTER — TELEPHONE (OUTPATIENT)
Age: 69
End: 2024-12-18

## 2024-12-18 NOTE — TELEPHONE ENCOUNTER
Mic Watkins MD  P Edw Bcn June Rns  Can you pls let her know that labs and vit d look good    EE verbal release reviewed   LVM to review the above.

## 2025-02-27 ENCOUNTER — TELEPHONE (OUTPATIENT)
Age: 70
End: 2025-02-27

## 2025-02-27 DIAGNOSIS — C50.811 MALIGNANT NEOPLASM OF OVERLAPPING SITES OF RIGHT BREAST IN FEMALE, ESTROGEN RECEPTOR POSITIVE (HCC): Primary | ICD-10-CM

## 2025-02-27 DIAGNOSIS — Z17.0 MALIGNANT NEOPLASM OF OVERLAPPING SITES OF RIGHT BREAST IN FEMALE, ESTROGEN RECEPTOR POSITIVE (HCC): Primary | ICD-10-CM

## 2025-02-27 RX ORDER — LETROZOLE 2.5 MG/1
2.5 TABLET, FILM COATED ORAL DAILY
Qty: 90 TABLET | Refills: 1 | Status: SHIPPED | OUTPATIENT
Start: 2025-02-27

## 2025-02-27 NOTE — TELEPHONE ENCOUNTER
Nidia [phone]  Would like for  her medication Letrozole to be entered for 90 day supplies.  Thanks Val

## 2025-03-27 DIAGNOSIS — C50.811 MALIGNANT NEOPLASM OF OVERLAPPING SITES OF RIGHT BREAST IN FEMALE, ESTROGEN RECEPTOR POSITIVE (HCC): ICD-10-CM

## 2025-03-27 DIAGNOSIS — Z17.0 MALIGNANT NEOPLASM OF OVERLAPPING SITES OF RIGHT BREAST IN FEMALE, ESTROGEN RECEPTOR POSITIVE (HCC): ICD-10-CM

## 2025-03-27 RX ORDER — LETROZOLE 2.5 MG/1
2.5 TABLET, FILM COATED ORAL DAILY
Qty: 90 TABLET | Refills: 1 | Status: SHIPPED | OUTPATIENT
Start: 2025-03-27

## 2025-07-14 NOTE — PROGRESS NOTES
Cancer Center Progress Note      Patient Name:  Nidia Ricci  YOB: 1955  Medical Record Number:  [id number]    Date of visit:  7/15/2025    CHIEF COMPLAINT: R breast carcinoma    HPI:     70 year old that I have followed since 1/24 after she underwent workup of abnormal mammogram.  H/o R breast carcinoma in 2000 in [place].  S/p lumpectomy, ALND, RT.  Took Tamoxifen for 3.5 years.  S/p HOLLY/BSO.  Genetic testing negative.    Diagnostic mammogram 11/23-1.5 cm R breast mass.  Bx-grade 2 IDC, 95% ER/NC, HER2 negative.  S/p bilateral mastectomy 1/8/24-1.2 cm grade 2 IDC.  LN not interrogated.  Oncotype was 4.  Letrozole started 2/24.  Presents for evaluation.    Hot flashes are better.  Went to [place] and got a supplement which is helping.  It is nonhormonal.  Some anxiety which is better.  No bone pain.  She is quite active.      SOCIAL HISTORY:    . Daughter is pharmacist. 2 grandkids.       ROS:     Constitutional: no fever, chills fatigue,night sweats or weight loss  Neurologic: no headache, seizures, diplopia or weakness  Respiratory: no cough, SOB or hemoptysis  Cardiovascular: no chest pain, ankle swelling, FORD  GI: no nausea, vomiting, diarrhea or BRBPR  Musculoskeletal: no body-ache or joint pain  Dermatologic: no alopecia, rash, pruritis  : no hematuria, dysuria or frequency  Psych: no confusion or depression   Heme: no easy bruising or bleeding     ALLERGIES:  No Known Allergies    MEDICATIONS:    Current Outpatient Medications   Medication Sig Dispense Refill    letrozole 2.5 MG Oral Tab TAKE 1 TABLET(2.5 MG) BY MOUTH DAILY 90 tablet 1    docusate sodium 100 MG Oral Cap Take 1 capsule (100 mg total) by mouth in the morning.      Calcium Carbonate Antacid 1000 MG Oral Chew Tab Chew 1,000 mg by mouth in the morning.      rivaroxaban (XARELTO) 20 MG Oral Tab TAKE ONE TABLET BY MOUTH DAILY WITH FOOD      Cyanocobalamin (VITAMIN B 12) 500 MCG Oral Tab Take 1,000 mcg by mouth in the  morning.      dronedarone HCl (MULTAQ) 400 MG Oral Tab Take 1 tablet (400 mg total) by mouth 2 (two) times daily. 180 tablet 3    ATORVASTATIN 10 MG Oral Tab TAKE 1 TABLET BY MOUTH EVERY NIGHT AT BEDTIME 90 tablet 3    escitalopram 10 MG Oral Tab   0    Coenzyme Q10 (CO Q-10) 100 MG Oral Cap Take by mouth in the morning.      Levothyroxine Sodium (SYNTHROID) 50 MCG Oral Tab Take 1 tablet (50 mcg total) by mouth before breakfast.  2    Ergocalciferol (VITAMIN D OR) Take 400 Units by mouth in the morning.         VITALS:  Height: --  Weight: 60.1 kg (132 lb 6.4 oz) (07/15 1028)  BSA (Calculated - sq m): --  Pulse: 75 (07/15 1028)  BP: 134/88 (07/15 1028)  Temp: 97.5 °F (36.4 °C) (07/15 1028)  Do Not Use - Resp Rate: --  SpO2: 95 % (07/15 1028)    PS:  ECO    PHYSICAL EXAM:    General: alert and oriented x 3, not in acute distress.  HEENT: SIDNEY, oropharynx  clear.  Neck: supple.  No JVD /adenopathy.  CVS: S1S2, regular  Rhythm, no murmurs.   Lungs: Clear to auscultation and percussion.  Abdomen: Soft, non tender, no hepato-splenomegaly.  Extremities:  No edema.  CNS: no focal deficit  Chest wall: Well-healed mastectomy scar.  No masses.    Emotional well being: Patient's emotional well being was assessed.  No issues requiring acute psychosocial intervention were identified.     LABS:     No results found for this or any previous visit (from the past 24 hours).    ASSESSMENT AND PLAN:     # Stage I R breast ca: S/p jerad mastectomy -1.2 cm grade 2 IDC, 95% ER/UT, HER2 neg, Ki-67 12%.  Genetic testing neg.     Oncotype score was 4 which corresponds to a 3% risk of distant recurrence with endocrine therapy.  Letrozole started .  Continue till .  Not candidate for RT or mammograms.    H/o R  breast carcinoma in 2020 in [place], s/p R lumpectomy, ALND.  Received RT and took tamoxifen for 3-1/2 years.    # Hot flashes: Now using herbal supplement from [place].    # Osteopenia: DEXA  showed osteopenia with  T-score -1.6.  Discussed adjuvant bisphosphonates per ASCO guidelines.  Her daughter is a pharmacist and she has increased her intake of dairy and vitamin D.  She wishes to see what her next DEXA looks like before deciding.  Repeat DEXA 1/26.    # Quality measures: none indicated    CBC/CMP 4/25 normal.    Survivorship issues were addressed with the patient.  Patient-reported issues included hot flashes .  Recommended interventions as follows:  Using supplement from [employer]     ORDERS PLACED:    Return in about 6 months (around 1/15/2026) for MD visit.    Zulay Watkins M.D.    [facility]    [facility] [place]  [address] [place], [address]    7/15/2025

## 2025-07-15 ENCOUNTER — OFFICE VISIT (OUTPATIENT)
Age: 70
End: 2025-07-15
Attending: SURGERY
Payer: MEDICARE

## 2025-07-15 VITALS
RESPIRATION RATE: 18 BRPM | DIASTOLIC BLOOD PRESSURE: 88 MMHG | SYSTOLIC BLOOD PRESSURE: 134 MMHG | OXYGEN SATURATION: 95 % | HEART RATE: 75 BPM | BODY MASS INDEX: 23 KG/M2 | WEIGHT: 132.38 LBS | TEMPERATURE: 98 F

## 2025-07-15 DIAGNOSIS — Z17.0 MALIGNANT NEOPLASM OF OVERLAPPING SITES OF RIGHT BREAST IN FEMALE, ESTROGEN RECEPTOR POSITIVE (HCC): Primary | ICD-10-CM

## 2025-07-15 DIAGNOSIS — C50.811 MALIGNANT NEOPLASM OF OVERLAPPING SITES OF RIGHT BREAST IN FEMALE, ESTROGEN RECEPTOR POSITIVE (HCC): Primary | ICD-10-CM

## 2025-07-15 DIAGNOSIS — Z78.0 POSTMENOPAUSAL: ICD-10-CM

## 2025-07-15 DIAGNOSIS — T50.905D MEDICATION ADVERSE EFFECT, SUBSEQUENT ENCOUNTER: ICD-10-CM

## 2025-07-15 NOTE — PROGRESS NOTES
Education Record    Learner:  Patient    Disease / Diagnosis: right breast cancer  Talib mastectomy     Barriers / Limitations:  None   Comments:    Method:  Discussion   Comments:    General Topics:  Plan of care reviewed   Comments:    Outcome:  Shows understanding   Comments:   Taking letrozole.     Having hot flashes, but started on a supplement last 3 weeks, that includes red shamrock, vit d, vit k calcium. And has less hot flashes.

## (undated) DEVICE — SUTURE SILK 2-0 FSL

## (undated) DEVICE — SPONGE GZ W4XL8IN COT 12 PLY WVN

## (undated) DEVICE — PAD,ABDOMINAL,8"X7.5",ST,LF,20/BX: Brand: MEDLINE INDUSTRIES, INC.

## (undated) DEVICE — VIOLET BRAIDED (POLYGLACTIN 910), SYNTHETIC ABSORBABLE SUTURE: Brand: COATED VICRYL

## (undated) DEVICE — BRA SURGICAL ELIZABETH PINK XL

## (undated) DEVICE — EVACUATOR SUR 100CC SIL BLB WND

## (undated) DEVICE — 3M™ STERI-STRIP™ REINFORCED ADHESIVE SKIN CLOSURES, R1547, 1/2 IN X 4 IN (12 MM X 100 MM), 6 STRIPS/ENVELOPE: Brand: 3M™ STERI-STRIP™

## (undated) DEVICE — UNDYED BRAIDED (POLYGLACTIN 910), SYNTHETIC ABSORBABLE SUTURE: Brand: COATED VICRYL

## (undated) DEVICE — BANDAGE EZY-WRAP ULTRA 6

## (undated) DEVICE — GAUZE,SPONGE,FLUFF,6"X6.75",STRL,5/TRAY: Brand: MEDLINE

## (undated) DEVICE — BREAST-HERNIA-PORT CDS-LF: Brand: MEDLINE INDUSTRIES, INC.

## (undated) DEVICE — SHEET,DRAPE,40X58,STERILE: Brand: MEDLINE

## (undated) DEVICE — SUTURE ETHLN SZ 2-0 L18IN NONABSORB BLK

## (undated) DEVICE — SLEEVE COMPR M KNEE LEN SGL USE KENDALL SCD

## (undated) DEVICE — LAPAROTOMY SPONGE - RF AND X-RAY DETECTABLE PRE-WASHED: Brand: SITUATE

## (undated) DEVICE — SOLUTION IRRIG 1000ML 0.9% NACL USP BTL

## (undated) DEVICE — STERILE POLYISOPRENE POWDER-FREE SURGICAL GLOVES: Brand: PROTEXIS

## (undated) DEVICE — DRAIN CHANNEL 19FR BLAKE

## (undated) DEVICE — 3M™ TEGADERM™ TRANSPARENT FILM DRESSING, 1626W, 4 IN X 4-3/4 IN (10 CM X 12 CM), 50 EACH/CARTON, 4 CARTON/CASE: Brand: 3M™ TEGADERM™

## (undated) DEVICE — BLADE SCPL 10 SHAW KNF PRCS

## (undated) DEVICE — BIOPATCH™ ANTIMICROBIAL DRESSING WITH CHLORHEXIDINE GLUCONATE IS A HYDROPHILLIC POLYURETHANE ABSORPTIVE FOAM WITH CHLORHEXIDINE GLUCONATE (CHG) WHICH INHIBITS BACTERIAL GROWTH UNDER THE DRESSING. THE DRESSING IS INTENDED TO BE USED TO ABSORB EXUDATE, COVER A WOUND CAUSED BY VASCULAR AND NONVASCULAR PERCUTANEOUS MEDICAL DEVICES DURING SURGERY, AS WELL AS REDUCE LOCAL INFECTION AND COLONIZATION OF MICROORGANISMS.: Brand: BIOPATCH

## (undated) NOTE — LETTER
OUTSIDE TESTING RESULT REQUEST     IMPORTANT: FOR YOUR IMMEDIATE ATTENTION  Please FAX all test results listed below to: [phone]     Testing already done on or about: .     * * * * If testing is NOT complete, arrange with patient A.S.A.P. * * * *      Patient Name: Nidia Ricci  Surgery Date: 2024  Medical Record: [id number]  CSN: [id number]  : 1955 - A: 68 y     Sex: female  Surgeon(s):  Kusum Sandhu MD  Procedure: RIGHT BREAST MASTECTOMY  Anesthesia Type: General     Surgeon: Kusum Sandhu MD     The following Testing and Time Line are REQUIRED PER ANESTHESIA     EKG READ AND SIGNED WITHIN   90 days  BMP (requires 4 hour fast) within  90 days      Thank You,   Sent by:Gianna Gómez RN

## (undated) NOTE — Clinical Note
I had the pleasure of seeing Nidia Ricci on 1/31/2024.  Please see my attached note.  Kusum Sandhu MD FACS [facility]--Surgery

## (undated) NOTE — LETTER
24    Patient: Nidia Ricci  : 1955 Visit date: 2024    Dear  Bautista Miller    Thank you for referring Nidia Ricci to my practice.  Please find my assessment and plan below.          PATHOLOGY   I reviewed the pathology report.     Final Diagnosis:   A.  Left breast, mastectomy:  -Breast tissue with patchy fibrocystic change.  -Nipple and skin with no significant changes.     B.  Right breast, mastectomy:  -Invasive ductal carcinoma (1.2 cm), Grade 2 (Tubules: 3, Nuclear: 2, Mitoses: 1).      -The margins are negative for invasive carcinoma (more than 10 mm).  -Ductal carcinoma in situ, nuclear grade 2, cribriform type.      -The margins are negative for DCIS (more than 10 mm).  -Adjacent prior biopsy site.  -Fibrous scar with calcifications.  -Uninvolved nipple and skin.   Electronically signed by Sophia Smith MD on 2024       Assessment   1. Recurrent breast cancer, right (HCC)          Plan   I reviewed the pathology report with the patient.  She has already met with Dr. Watkins virtually to discuss Oncotype testing.  I remove the CALLUM drains today in the office.  I will see her back in 2 weeks to assess her healing.  She may slowly resume her normal activities.      Sincerely,       Kusum Sandhu MD   CC: Mic Watkins MD

## (undated) NOTE — LETTER
[facility] Pre-Admission Testing Department  Phone: [phone]  Right Fax: [phone]  CLARIFICATION FOR E-SSS    Sent By:   [id number] Date: 2023     Patient Name: Nidia Ricci  Surgery Date: 2024  CSN: [id number]     Medical Record: [id number]  : 1955      Age: 68 year old        Sex: female    Surgeon(s) and Role:     * Kusum Sandhu MD - Primary  FAX: [phone]  Procedure Comments:  RIGHT BREAST MASTECTOMY    YOU SHOULD RECEIVE 1 PAGES (INCLUDING COVER SHEET)    Please note that clarification is needed on the Electronic-Surgery Scheduling Sheet (E-SSS)  the original is included with this letter. Please have physician review and make changes on the faxed copy of the E-SSS.    Laterality per patient should be bilateral  Procedure per patient should be Bilateral Breast Mastectomy    Per E-SSS admit type is  Hospital Outpatient Surgery    Please review and submit change if needed    Other: She opted to do bilateral not just right breast mastectomy.    ALL CHANGES MUST BE DOCUMENTED ON THE E-SSS AND SIGNED BY THE PHYSICIAN    After physician has made the changes and signed the E-SSS please fax it to [phone] and these changes will then be made in Epic by the OR schedulers.     If you have any questions please call Pre-Admission Testing at [phone]    Thank you,    Pre-Admission Testing    [address]  [address]  Phone: [phone]  Fax: [phone]  [email or web address]    STATEMENT OF CONFIDENTIALITY: This transmittal is intended only for the use of the individual entity to which is addressed and may contain information that is privileged and confidential. information contained. If the reader of this message IS NOT the intended recipient, you are hereby notified that any disclosure, distribution or copying of this information is strictly prohibited. If you have received this transmission in error, please notify us immediately  by telephone and return the original documents to us at the above address via the [employer] [place] Postal [employer]. Thank you.

## (undated) NOTE — LETTER
23    Patient: Andrew Purvis  : 1955 Visit date: 2023    Dear  Jackson Shipley    Thank you for referring Andrew Purvis to my practice. Please find my assessment and plan below. PATHOLOGY   I reviewed the pathology report. FINAL PATHOLOGY DIAGNOSIS:        Right breast (10:00, 3 cm from nipple), ultrasound guided biopsy: Invasive    mammary carcinoma, micropapillary type. Histologic grade ([place] histologic grading): Grade 2 (score 7)    (Tubules: Score 3; Nuclear : Score 3; and Mitotic rate: Score 1)      Tumor size: At least 0.5 cm maximum dimension. Extent of tumor: The tumor comprises approximately 40% of the biopsy, and is    present in several submitted biopsy fragments. Additional Tumor Features:    Lymphatic/Vascular Invasion: Identified. Microcalcifications: Not identified. Right breast tumor markers, block A2:    Estrogen receptor (ER): Positive (approximately 95%; staining intensity:    strong). Progesterone receptor (PgR): Positive (approximately 95%; staining    intensity: strong). Her-2: Negative (1+). Ki 67 (Mib-1): High proliferative index (approximately 25%). Assessment   1. Malignant neoplasm of upper-outer quadrant of right breast in female, estrogen receptor positive     2. History of right breast cancer    3. Paroxysmal atrial fibrillation (HCC)    4. Anticoagulated          Plan   I had a lengthy discussion with the patient regarding the diagnosis of breast cancer. We discussed the biology of breast cancer as well as the difference between in situ and invasive disease. We discussed the treatment options of breast cancer in regards to surgery, radiation, chemotherapy, and endocrine therapy. In regards to surgery, we discussed the options of lumpectomy and mastectomy and the difference between each option. The possibility of another surgery to achieve clear margins was also discussed.   The risks of surgery were discussed including bleeding, infection, need for reoperation, and arm swelling. She expressed understanding. We discussed the role of radiation therapy and if she opts for lumpectomy, she will need radiation therapy and the length and course of radiation therapy will depend on the final pathology. We also discussed situations where radiation therapy is needed after mastectomy (large tumor size, positive lymph nodes and positive margins on a mastectomy). The final decision regarding raditation therapy would be made after surgery. In regards to chemotherapy, I will send her to medical oncology. Based on the final pathology report it will be determined if chemotherapy or endocrine therapy would provide any additional benefit. Since this is a recurrence in the same breast, she will need a mastectomy. We discussed the option of reconstruction, but she declined. The patient does qualify for genetic testing. She will meet with our genetic counselor today. I did discuss that if she is gene positive, I would then recommend a bilateral mastectomy. She will need preoperative cardiac clearance and advice regarding management of her Xarelto prior to surgery. Her blood pressure was also found to be elevated at today's visit. I recommended she call her PCP. All of her questions were answered to her satisfaction. Preoperatively, the patient has stage I breast cancer.        Sincerely,       Turner Krueger MD   CC: Bk Ji MD